# Patient Record
Sex: FEMALE | Race: OTHER | HISPANIC OR LATINO
[De-identification: names, ages, dates, MRNs, and addresses within clinical notes are randomized per-mention and may not be internally consistent; named-entity substitution may affect disease eponyms.]

---

## 2021-04-01 ENCOUNTER — APPOINTMENT (OUTPATIENT)
Dept: RHEUMATOLOGY | Facility: CLINIC | Age: 41
End: 2021-04-01
Payer: COMMERCIAL

## 2021-04-01 VITALS
DIASTOLIC BLOOD PRESSURE: 80 MMHG | TEMPERATURE: 96.6 F | BODY MASS INDEX: 27.05 KG/M2 | WEIGHT: 147 LBS | OXYGEN SATURATION: 98 % | HEART RATE: 73 BPM | HEIGHT: 62 IN | SYSTOLIC BLOOD PRESSURE: 102 MMHG

## 2021-04-01 DIAGNOSIS — Z78.9 OTHER SPECIFIED HEALTH STATUS: ICD-10-CM

## 2021-04-01 DIAGNOSIS — Z80.42 FAMILY HISTORY OF MALIGNANT NEOPLASM OF PROSTATE: ICD-10-CM

## 2021-04-01 DIAGNOSIS — Z82.62 FAMILY HISTORY OF OSTEOPOROSIS: ICD-10-CM

## 2021-04-01 DIAGNOSIS — M25.532 PAIN IN RIGHT WRIST: ICD-10-CM

## 2021-04-01 DIAGNOSIS — Z82.61 FAMILY HISTORY OF ARTHRITIS: ICD-10-CM

## 2021-04-01 DIAGNOSIS — M25.531 PAIN IN RIGHT WRIST: ICD-10-CM

## 2021-04-01 PROCEDURE — 99072 ADDL SUPL MATRL&STAF TM PHE: CPT

## 2021-04-01 PROCEDURE — 99205 OFFICE O/P NEW HI 60 MIN: CPT

## 2021-04-03 NOTE — ASSESSMENT
[FreeTextEntry1] : 40 year old female seen today for evaluation of left ankle pain and swelling.\par \par \par 1. Left ankle PVNS, dffuse type\par \par -MRI left ankle suggested PVNS with findings of 3.5 x 2.3 x 2.7 cm mass like synovial hypertrophy arising from anterolateral/dorsal talonavicular joint with possible involvement of the Gruberi bursa and smooth erosions of the dorsal talus. Anterior and calcaneofibular ligaments with findings suggestive of prior injury.\par -First line treatment is surgical with excision/synovectomy (arthroscopic vs. open). \par -Systemic therapy with pexidartinib and imatinib, nilotinib has been used for relapsing disease\par -Will check x-ray wrists, autoimmune serologies, inflammatory markers, x-ray wrists to rule out PVNS vs. inflammatory arthritis \par \par
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
## 2021-04-03 NOTE — PHYSICAL EXAM
[General Appearance - Alert] : alert [General Appearance - Well Nourished] : well nourished [Sclera] : the sclera and conjunctiva were normal [Extraocular Movements] : extraocular movements were intact [Outer Ear] : the ears and nose were normal in appearance [Hearing Threshold Finger Rub Not [place]] : hearing was normal [Neck Appearance] : the appearance of the neck was normal [Neck Cervical Mass (___cm)] : no neck mass was observed [Respiration, Rhythm And Depth] : normal respiratory rhythm and effort [Auscultation Breath Sounds / Voice Sounds] : lungs were clear to auscultation bilaterally [Heart Rate And Rhythm] : heart rate was normal and rhythm regular [Heart Sounds] : normal S1 and S2 [Bowel Sounds] : normal bowel sounds [Abdomen Tenderness] : non-tender [] : no rash [Skin Lesions] : no skin lesions [Sensation] : the sensory exam was normal to light touch and pinprick [Motor Exam] : the motor exam was normal [Mood] : the mood was normal [Affect] : the affect was normal [FreeTextEntry1] : No synovitis in wrists. Left ankle slight swelling compared to right. No other swelling or tenderness in joints.

## 2021-04-06 ENCOUNTER — NON-APPOINTMENT (OUTPATIENT)
Age: 41
End: 2021-04-06

## 2021-04-15 ENCOUNTER — APPOINTMENT (OUTPATIENT)
Dept: RHEUMATOLOGY | Facility: CLINIC | Age: 41
End: 2021-04-15
Payer: COMMERCIAL

## 2021-04-15 ENCOUNTER — TRANSCRIPTION ENCOUNTER (OUTPATIENT)
Age: 41
End: 2021-04-15

## 2021-04-15 VITALS
TEMPERATURE: 97.2 F | WEIGHT: 147 LBS | HEIGHT: 62 IN | HEART RATE: 88 BPM | OXYGEN SATURATION: 98 % | BODY MASS INDEX: 27.05 KG/M2

## 2021-04-15 DIAGNOSIS — M12.20 VILLONODULAR SYNOVITIS (PIGMENTED), UNSPECIFIED SITE: ICD-10-CM

## 2021-04-15 PROCEDURE — 99072 ADDL SUPL MATRL&STAF TM PHE: CPT

## 2021-04-15 PROCEDURE — 99214 OFFICE O/P EST MOD 30 MIN: CPT

## 2021-04-19 NOTE — ASSESSMENT
[FreeTextEntry1] : 40 year old female seen today for evaluation of left ankle pain and swelling.\par \par \par 1. Left ankle PVNS, dffuse type\par \par -MRI left ankle suggested PVNS with findings of 3.5 x 2.3 x 2.7 cm mass like synovial hypertrophy arising from anterolateral/dorsal talonavicular joint with possible involvement of the Gruberi bursa and smooth erosions of the dorsal talus. Anterior and calcaneofibular ligaments with findings suggestive of prior injury.\par -First line treatment is surgical with excision/synovectomy (arthroscopic vs. open). \par -Systemic therapy with pexidartinib and imatinib, nilotinib has been used for relapsing disease\par -Discussed treatment options in detail as above. She wishes to think about medical management as opposed to surgical management. I will refer her to oncology for this. If she wishes for surgical management I advised orthopedic follow up. All questions answered and they understand and agree to plan. \par \par 2. Arthralgias\par \par -Wrist x-ray and autoimmune serologies are negative. No concern for inflammatory arthritis.\par \par
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
## 2021-04-19 NOTE — PHYSICAL EXAM
[General Appearance - Alert] : alert [General Appearance - Well Nourished] : well nourished [Sclera] : the sclera and conjunctiva were normal [Extraocular Movements] : extraocular movements were intact [Outer Ear] : the ears and nose were normal in appearance [Hearing Threshold Finger Rub Not [place]] : hearing was normal [Neck Appearance] : the appearance of the neck was normal [Respiration, Rhythm And Depth] : normal respiratory rhythm and effort [Neck Cervical Mass (___cm)] : no neck mass was observed [Auscultation Breath Sounds / Voice Sounds] : lungs were clear to auscultation bilaterally [Heart Rate And Rhythm] : heart rate was normal and rhythm regular [Heart Sounds] : normal S1 and S2 [Bowel Sounds] : normal bowel sounds [Abdomen Tenderness] : non-tender [] : no rash [Skin Lesions] : no skin lesions [Sensation] : the sensory exam was normal to light touch and pinprick [Motor Exam] : the motor exam was normal [Affect] : the affect was normal [Mood] : the mood was normal [FreeTextEntry1] : No synovitis in wrists. Left ankle slight swelling compared to right. No other swelling or tenderness in joints.

## 2021-05-12 ENCOUNTER — APPOINTMENT (OUTPATIENT)
Dept: HEMATOLOGY ONCOLOGY | Facility: CLINIC | Age: 41
End: 2021-05-12
Payer: COMMERCIAL

## 2021-05-12 ENCOUNTER — LABORATORY RESULT (OUTPATIENT)
Age: 41
End: 2021-05-12

## 2021-05-12 VITALS
DIASTOLIC BLOOD PRESSURE: 77 MMHG | SYSTOLIC BLOOD PRESSURE: 126 MMHG | BODY MASS INDEX: 27.44 KG/M2 | RESPIRATION RATE: 14 BRPM | TEMPERATURE: 98.6 F | WEIGHT: 150 LBS | HEART RATE: 77 BPM

## 2021-05-12 PROCEDURE — 99205 OFFICE O/P NEW HI 60 MIN: CPT

## 2021-05-13 LAB
ALBUMIN SERPL ELPH-MCNC: 4.4 G/DL
ALP BLD-CCNC: 72 U/L
ALT SERPL-CCNC: 8 U/L
ANION GAP SERPL CALC-SCNC: 11 MMOL/L
AST SERPL-CCNC: 15 U/L
BILIRUB SERPL-MCNC: <0.2 MG/DL
BUN SERPL-MCNC: 20 MG/DL
CALCIUM SERPL-MCNC: 9.3 MG/DL
CHLORIDE SERPL-SCNC: 101 MMOL/L
CO2 SERPL-SCNC: 24 MMOL/L
CREAT SERPL-MCNC: 0.8 MG/DL
FERRITIN SERPL-MCNC: 7 NG/ML
FOLATE SERPL-MCNC: 15.5 NG/ML
GLUCOSE SERPL-MCNC: 82 MG/DL
HAPTOGLOB SERPL-MCNC: 79 MG/DL
HCT VFR BLD CALC: 36.1 %
HGB BLD-MCNC: 11.3 G/DL
IRON SATN MFR SERPL: 6 %
IRON SERPL-MCNC: 24 UG/DL
LDH SERPL-CCNC: 185
MCHC RBC-ENTMCNC: 22.7 PG
MCHC RBC-ENTMCNC: 31.3 G/DL
MCV RBC AUTO: 72.6 FL
PLATELET # BLD AUTO: 342 K/UL
PMV BLD: 9.6 FL
POTASSIUM SERPL-SCNC: 4.2 MMOL/L
PROT SERPL-MCNC: 7.1 G/DL
RBC # BLD: 4.97 M/UL
RBC # FLD: 15.6 %
RETICS # AUTO: 1.3 %
RETICS AGGREG/RBC NFR: 66.1 K/UL
SODIUM SERPL-SCNC: 136 MMOL/L
TIBC SERPL-MCNC: 400 UG/DL
UIBC SERPL-MCNC: 376 UG/DL
VIT B12 SERPL-MCNC: 444 PG/ML
WBC # FLD AUTO: 9.13 K/UL

## 2021-05-20 ENCOUNTER — APPOINTMENT (OUTPATIENT)
Dept: GASTROENTEROLOGY | Facility: CLINIC | Age: 41
End: 2021-05-20
Payer: COMMERCIAL

## 2021-05-20 DIAGNOSIS — Z80.42 FAMILY HISTORY OF MALIGNANT NEOPLASM OF PROSTATE: ICD-10-CM

## 2021-05-20 DIAGNOSIS — Z86.2 PERSONAL HISTORY OF DISEASES OF THE BLOOD AND BLOOD-FORMING ORGANS AND CERTAIN DISORDERS INVOLVING THE IMMUNE MECHANISM: ICD-10-CM

## 2021-05-20 DIAGNOSIS — Z80.1 FAMILY HISTORY OF MALIGNANT NEOPLASM OF TRACHEA, BRONCHUS AND LUNG: ICD-10-CM

## 2021-05-20 DIAGNOSIS — Z80.41 FAMILY HISTORY OF MALIGNANT NEOPLASM OF OVARY: ICD-10-CM

## 2021-05-20 DIAGNOSIS — K59.00 CONSTIPATION, UNSPECIFIED: ICD-10-CM

## 2021-05-20 PROCEDURE — 99204 OFFICE O/P NEW MOD 45 MIN: CPT | Mod: 95

## 2021-05-20 NOTE — ASSESSMENT
[FreeTextEntry1] : 40 year old female patient recently diagnosed with iron deficiency anemia referred for work up.\par Patient reports postprandial belching and bloating, nausea and indigestion and acid reflux even at night. \par Denies any blood in her stool, abdominal pain or weight loss, but has recently developed constipation. \par \par DOMINGO, no gross GI bleeding, heavy menstrual periods\par needs EGD, colonoscopy w biopsies\par Risks and benefits discussed with patient.\par Capsule endoscopy thereafter\par

## 2021-05-20 NOTE — HISTORY OF PRESENT ILLNESS
[Home] : at home, [unfilled] , at the time of the visit. [Verbal consent obtained from patient] : the patient, [unfilled] [Medical Office: ([facility])___] : at the medical office located in  [FreeTextEntry4] : Raya Agarwal [de-identified] : 40 year old female patient recently diagnosed with iron deficiency anemia referred for work up.\par Patient reports postprandial belching and bloating, nausea and indigestion and acid reflux even at night. \par Denies any blood in her stool, abdominal pain or weight loss, but has recently developed constipation.

## 2021-05-21 ENCOUNTER — APPOINTMENT (OUTPATIENT)
Dept: INFUSION THERAPY | Facility: CLINIC | Age: 41
End: 2021-05-21

## 2021-05-21 RX ORDER — IRON SUCROSE 20 MG/ML
200 INJECTION, SOLUTION INTRAVENOUS ONCE
Refills: 0 | Status: COMPLETED | OUTPATIENT
Start: 2021-05-21 | End: 2021-05-21

## 2021-05-21 RX ADMIN — IRON SUCROSE 200 MILLIGRAM(S): 20 INJECTION, SOLUTION INTRAVENOUS at 15:41

## 2021-05-21 RX ADMIN — IRON SUCROSE 146.67 MILLIGRAM(S): 20 INJECTION, SOLUTION INTRAVENOUS at 15:11

## 2021-05-28 ENCOUNTER — APPOINTMENT (OUTPATIENT)
Dept: INFUSION THERAPY | Facility: CLINIC | Age: 41
End: 2021-05-28

## 2021-05-28 RX ORDER — IRON SUCROSE 20 MG/ML
200 INJECTION, SOLUTION INTRAVENOUS ONCE
Refills: 0 | Status: COMPLETED | OUTPATIENT
Start: 2021-05-28 | End: 2021-05-28

## 2021-05-28 RX ADMIN — IRON SUCROSE 146.67 MILLIGRAM(S): 20 INJECTION, SOLUTION INTRAVENOUS at 14:29

## 2021-05-28 RX ADMIN — IRON SUCROSE 200 MILLIGRAM(S): 20 INJECTION, SOLUTION INTRAVENOUS at 14:59

## 2021-06-03 NOTE — HISTORY OF PRESENT ILLNESS
[de-identified] : Chayito is a 39 yo F with history of recently discovered PVNS of her left ankle presenting to clinic for history of anemia. Per patient, she has been in relatively good health until recently when she began to feel left sided ankle pain that is worse with exertion. She went to see an orthopedic specialist and was ultimately referred to rheumatology for further evaluation. She had a MRI done on her left ankle at [facility] on 3/28/21 which showed "3.5 x 2.3 x 2.7 cm mass like synovial hypertrophy arising from the anterolateral/dorsal talonavicular joint with possible involvement of the Gruberi bursa and smooth erosions of the dorsal talus. Overall appearance of findings suggestive of pigmented villonodular synovitis (PVNS). Soft tissue sampling can be considered to confirm diagnosis.\par \par She also had routine blood work sent which showed a hemoglobin of 11.5 with MCV 74.3, RBC count 4.98 with RDW 15.3. WBC and platelet count were normal. She was referred to hematology for further evaluation. \par \par She does endorse that she experiences heavy menses for the last 1 year. She denies any other sign of overt bleeding such as melena, hematochezia, gum or nose bleeding. She does endorse a history of anemia in her mother who required IV iron transfusions when younger. She has never had an EGD or colonoscopy though she is pending evaluation with GI in the next 1 month. \par \par Today, she endorses that she feels fatigue along with cold intolerance and craving for ice chips.

## 2021-06-03 NOTE — ASSESSMENT
[FreeTextEntry1] : #) Microcytic Anemia\par - Patient with Hemoglobin of 11.5 with MCV 74.3, RBC count 4.98 with RDW 15.3\par - Repeat CBC done in office shows hemoglobin of 11.3 with MCV 72.6\par - Iron studies confirm iron deficiency anemia with %sat of 6 and Ferritin 7. Hemolytic work up unremarkable \par - She will need IV Venofer x 5 doses, to be administered once a week \par - She is to follow up with GI, Dr. Elise for possible endoscopy and colonoscopy\par - She will follow up with GYN for heavy menstrual periods \par - Repeat  blood work to be done at next visit after completion of IV Venofer\par - Will consider sending Hemoglobin electrophoresis given her mother has history of anemia \par \par #) Suspected PVNS of left ankle\par - She was advised to follow up with her orthopedist to undergo evaluation for possible resection given this is recommending first line treatment, she is agreeable \par \par RTC in 8 weeks \par

## 2021-06-04 ENCOUNTER — APPOINTMENT (OUTPATIENT)
Dept: INFUSION THERAPY | Facility: CLINIC | Age: 41
End: 2021-06-04

## 2021-06-04 RX ORDER — IRON SUCROSE 20 MG/ML
200 INJECTION, SOLUTION INTRAVENOUS ONCE
Refills: 0 | Status: COMPLETED | OUTPATIENT
Start: 2021-06-04 | End: 2021-06-04

## 2021-06-04 RX ADMIN — IRON SUCROSE 146.67 MILLIGRAM(S): 20 INJECTION, SOLUTION INTRAVENOUS at 15:20

## 2021-06-07 ENCOUNTER — APPOINTMENT (OUTPATIENT)
Dept: ORTHOPEDIC SURGERY | Facility: CLINIC | Age: 41
End: 2021-06-07

## 2021-06-11 ENCOUNTER — APPOINTMENT (OUTPATIENT)
Dept: INFUSION THERAPY | Facility: CLINIC | Age: 41
End: 2021-06-11

## 2021-06-11 RX ORDER — IRON SUCROSE 20 MG/ML
200 INJECTION, SOLUTION INTRAVENOUS ONCE
Refills: 0 | Status: COMPLETED | OUTPATIENT
Start: 2021-06-11 | End: 2021-06-11

## 2021-06-11 RX ADMIN — IRON SUCROSE 146.67 MILLIGRAM(S): 20 INJECTION, SOLUTION INTRAVENOUS at 15:17

## 2021-06-11 RX ADMIN — IRON SUCROSE 200 MILLIGRAM(S): 20 INJECTION, SOLUTION INTRAVENOUS at 15:50

## 2021-06-18 ENCOUNTER — APPOINTMENT (OUTPATIENT)
Dept: INFUSION THERAPY | Facility: CLINIC | Age: 41
End: 2021-06-18

## 2021-06-18 VITALS
HEART RATE: 85 BPM | TEMPERATURE: 97.8 F | DIASTOLIC BLOOD PRESSURE: 76 MMHG | SYSTOLIC BLOOD PRESSURE: 116 MMHG | RESPIRATION RATE: 16 BRPM

## 2021-06-18 RX ORDER — IRON SUCROSE 20 MG/ML
200 INJECTION, SOLUTION INTRAVENOUS ONCE
Refills: 0 | Status: COMPLETED | OUTPATIENT
Start: 2021-06-18 | End: 2021-06-18

## 2021-06-18 RX ADMIN — IRON SUCROSE 200 MILLIGRAM(S): 20 INJECTION, SOLUTION INTRAVENOUS at 14:35

## 2021-06-18 RX ADMIN — IRON SUCROSE 146.67 MILLIGRAM(S): 20 INJECTION, SOLUTION INTRAVENOUS at 14:04

## 2021-06-25 ENCOUNTER — RESULT REVIEW (OUTPATIENT)
Age: 41
End: 2021-06-25

## 2021-06-25 ENCOUNTER — OUTPATIENT (OUTPATIENT)
Dept: OUTPATIENT SERVICES | Facility: HOSPITAL | Age: 41
LOS: 1 days | Discharge: HOME | End: 2021-06-25
Payer: COMMERCIAL

## 2021-06-25 ENCOUNTER — TRANSCRIPTION ENCOUNTER (OUTPATIENT)
Age: 41
End: 2021-06-25

## 2021-06-25 VITALS
HEIGHT: 62 IN | DIASTOLIC BLOOD PRESSURE: 76 MMHG | OXYGEN SATURATION: 100 % | TEMPERATURE: 98 F | RESPIRATION RATE: 18 BRPM | SYSTOLIC BLOOD PRESSURE: 121 MMHG | HEART RATE: 88 BPM | WEIGHT: 147.93 LBS

## 2021-06-25 VITALS — HEART RATE: 62 BPM | DIASTOLIC BLOOD PRESSURE: 72 MMHG | SYSTOLIC BLOOD PRESSURE: 122 MMHG | RESPIRATION RATE: 18 BRPM

## 2021-06-25 PROCEDURE — 88312 SPECIAL STAINS GROUP 1: CPT | Mod: 26

## 2021-06-25 PROCEDURE — 88305 TISSUE EXAM BY PATHOLOGIST: CPT | Mod: 26

## 2021-06-25 PROCEDURE — 43239 EGD BIOPSY SINGLE/MULTIPLE: CPT | Mod: XS

## 2021-06-25 PROCEDURE — 45380 COLONOSCOPY AND BIOPSY: CPT

## 2021-06-25 NOTE — ASU DISCHARGE PLAN (ADULT/PEDIATRIC) - CARE PROVIDER_API CALL
Germania Elise (MD)  Gastroenterology  [address] [place] [address]  Phone: [phone]  Fax: [phone]  Follow Up Time:

## 2021-06-28 LAB
SURGICAL PATHOLOGY STUDY: SIGNIFICANT CHANGE UP
SURGICAL PATHOLOGY STUDY: SIGNIFICANT CHANGE UP

## 2021-07-01 DIAGNOSIS — D12.6 BENIGN NEOPLASM OF COLON, UNSPECIFIED: ICD-10-CM

## 2021-07-01 DIAGNOSIS — K29.50 UNSPECIFIED CHRONIC GASTRITIS WITHOUT BLEEDING: ICD-10-CM

## 2021-07-01 DIAGNOSIS — K29.80 DUODENITIS WITHOUT BLEEDING: ICD-10-CM

## 2021-07-01 DIAGNOSIS — D50.9 IRON DEFICIENCY ANEMIA, UNSPECIFIED: ICD-10-CM

## 2021-07-01 DIAGNOSIS — K64.4 RESIDUAL HEMORRHOIDAL SKIN TAGS: ICD-10-CM

## 2021-07-01 DIAGNOSIS — K62.1 RECTAL POLYP: ICD-10-CM

## 2021-07-21 ENCOUNTER — APPOINTMENT (OUTPATIENT)
Dept: HEMATOLOGY ONCOLOGY | Facility: CLINIC | Age: 41
End: 2021-07-21
Payer: COMMERCIAL

## 2021-07-21 ENCOUNTER — LABORATORY RESULT (OUTPATIENT)
Age: 41
End: 2021-07-21

## 2021-07-21 ENCOUNTER — OUTPATIENT (OUTPATIENT)
Dept: OUTPATIENT SERVICES | Facility: HOSPITAL | Age: 41
LOS: 1 days | Discharge: HOME | End: 2021-07-21

## 2021-07-21 VITALS
HEART RATE: 73 BPM | DIASTOLIC BLOOD PRESSURE: 74 MMHG | RESPIRATION RATE: 16 BRPM | TEMPERATURE: 97.1 F | BODY MASS INDEX: 27.23 KG/M2 | HEIGHT: 62 IN | WEIGHT: 148 LBS | SYSTOLIC BLOOD PRESSURE: 109 MMHG

## 2021-07-21 DIAGNOSIS — Z00.00 ENCOUNTER FOR GENERAL ADULT MEDICAL EXAMINATION W/OUT ABNORMAL FINDINGS: ICD-10-CM

## 2021-07-21 DIAGNOSIS — D50.9 IRON DEFICIENCY ANEMIA, UNSPECIFIED: ICD-10-CM

## 2021-07-21 PROBLEM — D64.9 ANEMIA, UNSPECIFIED: Chronic | Status: ACTIVE | Noted: 2021-06-25

## 2021-07-21 LAB
HCT VFR BLD CALC: 42.1 %
HGB BLD-MCNC: 13.5 G/DL
MCHC RBC-ENTMCNC: 25.4 PG
MCHC RBC-ENTMCNC: 32.1 G/DL
MCV RBC AUTO: 79.3 FL
PLATELET # BLD AUTO: 305 K/UL
PMV BLD: 9.7 FL
RBC # BLD: 5.31 M/UL
RBC # FLD: 17.5 %
WBC # FLD AUTO: 6.68 K/UL

## 2021-07-21 PROCEDURE — 99213 OFFICE O/P EST LOW 20 MIN: CPT

## 2021-07-25 NOTE — HISTORY OF PRESENT ILLNESS
[de-identified] : Chayito is a 41 yo F with history of recently discovered PVNS of her left ankle presenting to clinic for history of anemia. Per patient, she has been in relatively good health until recently when she began to feel left sided ankle pain that is worse with exertion. She went to see an orthopedic specialist and was ultimately referred to rheumatology for further evaluation. She had a MRI done on her left ankle at [facility] on 3/28/21 which showed "3.5 x 2.3 x 2.7 cm mass like synovial hypertrophy arising from the anterolateral/dorsal talonavicular joint with possible involvement of the Gruberi bursa and smooth erosions of the dorsal talus. Overall appearance of findings suggestive of pigmented villonodular synovitis (PVNS). Soft tissue sampling can be considered to confirm diagnosis.\par \par She also had routine blood work sent which showed a hemoglobin of 11.5 with MCV 74.3, RBC count 4.98 with RDW 15.3. WBC and platelet count were normal. She was referred to hematology for further evaluation. \par \par She does endorse that she experiences heavy menses for the last 1 year. She denies any other sign of overt bleeding such as melena, hematochezia, gum or nose bleeding. She does endorse a history of anemia in her mother who required IV iron transfusions when younger. She has never had an EGD or colonoscopy though she is pending evaluation with GI in the next 1 month. \par \par Today, she endorses that she feels fatigue along with cold intolerance and craving for ice chips.   [de-identified] : 7/21/2021:  Chayito is a 40 present here today for a follow up for DOMINGO. Patient reports feeling well. she just had Colonoscopy done with 2 polyps removed, she was also diagnosed with Chronic Gastritis. \par She is scheduled for Small bowel series on 8/17/21 with Dr. GUTIERREZ\par We reviewed CBC from today with HGB/ HCt i s 13.5/ 42.1, iron studies are pending. \par Also patient will follow up in [facility] for Left ankle PVNS.\par

## 2021-07-25 NOTE — END OF VISIT
[FreeTextEntry3] : Patient was seen seen examined with NP Rose Mary, agree with above plan of care.\par

## 2021-07-25 NOTE — ASSESSMENT
[FreeTextEntry1] : #) Microcytic Anemia\par - Patient with Hemoglobin of 11.5 with MCV 74.3, RBC count 4.98 with RDW 15.3\par - Repeat CBC done in office shows hemoglobin of 11.3 with MCV 72.6\par - Iron studies confirm iron deficiency anemia with %sat of 6 and Ferritin 7. Hemolytic work up unremarkable \par - She will need IV Venofer x 5 doses, to be administered once a week \par - She is to follow up with GI, Dr. Elise for possible endoscopy and colonoscopy\par - She will follow up with GYN for heavy menstrual periods \par - Repeat  blood work to be done at next visit after completion of IV Venofer\par - ON 7/21/21 Monitor CBC and Ferritin Level. \par - Follow up with GI as scheduled\par \par #) Suspected PVNS of left ankle\par - She was advised to follow up with her orthopedist to undergo evaluation for possible resection given this is recommending first line treatment, she is agreeable \par - She will  follow up in [facility] for Left ankle  PVNS\par \par RTC in 8 weeks \par Patient seen and blood work reviewed  with Dr. Hatfield who agreed for the above plan of care. \par

## 2021-07-29 LAB
ENDOMYSIUM IGA SER QL: NEGATIVE
ENDOMYSIUM IGA TITR SER: NORMAL
FERRITIN SERPL-MCNC: 136 NG/ML
HGB A MFR BLD: 97.7 %
HGB A2 MFR BLD: 2.3 %
HGB FRACT BLD-IMP: NORMAL
IRON SATN MFR SERPL: 35 %
IRON SERPL-MCNC: 100 UG/DL
TIBC SERPL-MCNC: 285 UG/DL
TTG IGA SER IA-ACNC: <1.2 U/ML
TTG IGA SER-ACNC: NEGATIVE
TTG IGG SER IA-ACNC: 1.2 U/ML
TTG IGG SER IA-ACNC: NEGATIVE
UIBC SERPL-MCNC: 185 UG/DL

## 2021-08-17 ENCOUNTER — LABORATORY RESULT (OUTPATIENT)
Age: 41
End: 2021-08-17

## 2021-08-17 ENCOUNTER — OUTPATIENT (OUTPATIENT)
Dept: OUTPATIENT SERVICES | Facility: HOSPITAL | Age: 41
LOS: 1 days | Discharge: HOME | End: 2021-08-17

## 2021-08-17 DIAGNOSIS — Z11.59 ENCOUNTER FOR SCREENING FOR OTHER VIRAL DISEASES: ICD-10-CM

## 2021-08-19 ENCOUNTER — OUTPATIENT (OUTPATIENT)
Dept: OUTPATIENT SERVICES | Facility: HOSPITAL | Age: 41
LOS: 1 days | Discharge: HOME | End: 2021-08-19
Payer: COMMERCIAL

## 2021-08-19 VITALS
RESPIRATION RATE: 18 BRPM | TEMPERATURE: 97 F | HEART RATE: 67 BPM | WEIGHT: 147.93 LBS | DIASTOLIC BLOOD PRESSURE: 78 MMHG | HEIGHT: 62 IN | SYSTOLIC BLOOD PRESSURE: 115 MMHG

## 2021-08-19 DIAGNOSIS — B50.9 PLASMODIUM FALCIPARUM MALARIA, UNSPECIFIED: ICD-10-CM

## 2021-08-19 PROCEDURE — 91110 GI TRC IMG INTRAL ESOPH-ILE: CPT | Mod: 26

## 2021-08-19 NOTE — H&P PST ADULT - HISTORY OF PRESENT ILLNESS
40 year old female patient recently diagnosed with iron deficiency anemia referred for work up. Patient reports postprandial belching and bloating, nausea and indigestion and acid reflux even at night. Denies any blood in her stool, abdominal pain or weight loss, but has recently developed constipation. Pt underwent EGD and colonoscopy which did not identify any overt etiology as such pt will undergo VCE.

## 2021-08-19 NOTE — H&P PST ADULT - ASSESSMENT
40 year old female patient recently diagnosed with iron deficiency anemia referred for work up. Patient reports postprandial belching and bloating, nausea and indigestion and acid reflux even at night. Denies any blood in her stool, abdominal pain or weight loss, but has recently developed constipation. Pt underwent EGD and colonoscopy which did not identify any overt etiology as such pt will undergo VCE.    #Iron Deficiency anemia  - EGD: < from: EGD-Colonoscopy (06.25.21 @ 12:15) >   Normal mucosa in the whole esophagus.    Erythema in the stomach compatible with non-erosive gastritis. (Biopsy).    Normal mucosa in the whole examined duodenum. (Biopsy).     - Colonoscopy- < from: EGD-Colonoscopy (06.25.21 @ 12:15) >   Polyp (7 mm) in the mid rectum. (Polypectomy).    Polyp (5 mm) in the ascending colon. (Polypectomy).    Otherwise normal colon and terminal ileum.    External hemorrhoids.     < end of copied text >    - Will proceed with VCE

## 2021-08-19 NOTE — ASU PATIENT PROFILE, ADULT - HISTORY OF COVID-19 VACCINATION
Yes Principal Discharge DX:	Shortness of breath  Assessment and plan of treatment:	Call your primary care doctor today or tomorrow to schedule follow up appointment for within the next 3-5 days.  Limit your use of recreational drugs.  Return to this Emergency Department if you experience worsening condition or for any other emergency.

## 2021-08-19 NOTE — ASU DISCHARGE PLAN (ADULT/PEDIATRIC) - CALL YOUR DOCTOR IF YOU HAVE ANY OF THE FOLLOWING:
Bleeding that does not stop/Pain not relieved by Medications/Unable to urinate/Excessive diarrhea/Inability to tolerate liquids or foods

## 2021-08-19 NOTE — ASU DISCHARGE PLAN (ADULT/PEDIATRIC) - DISCHARGE PLAN IS COMPLETE AND GIVEN TO PATIENT
: Yes The patient was informed that with the Basic option, they will most likely need prescription glasses at all focal points after surgery. The patient elects Basic OD, goal of -2.00.

## 2021-08-19 NOTE — ASU PATIENT PROFILE, ADULT - MEDICATIONS BROUGHT TO HOSPITAL, PROFILE
ANN-MARIE 09/2017 with Dr. Ty   Pt scheduled with dr. Cool next month   Please advise on Quinaprill    /    PHCY set  
no

## 2021-09-24 ENCOUNTER — APPOINTMENT (OUTPATIENT)
Dept: HEMATOLOGY ONCOLOGY | Facility: CLINIC | Age: 41
End: 2021-09-24

## 2021-10-15 ENCOUNTER — EMERGENCY (EMERGENCY)
Facility: HOSPITAL | Age: 41
LOS: 0 days | Discharge: HOME | End: 2021-10-15
Attending: STUDENT IN AN ORGANIZED HEALTH CARE EDUCATION/TRAINING PROGRAM | Admitting: STUDENT IN AN ORGANIZED HEALTH CARE EDUCATION/TRAINING PROGRAM
Payer: COMMERCIAL

## 2021-10-15 VITALS
SYSTOLIC BLOOD PRESSURE: 125 MMHG | HEART RATE: 79 BPM | TEMPERATURE: 98 F | OXYGEN SATURATION: 99 % | DIASTOLIC BLOOD PRESSURE: 79 MMHG | RESPIRATION RATE: 18 BRPM

## 2021-10-15 VITALS
TEMPERATURE: 98 F | HEART RATE: 88 BPM | RESPIRATION RATE: 18 BRPM | DIASTOLIC BLOOD PRESSURE: 87 MMHG | HEIGHT: 62 IN | OXYGEN SATURATION: 98 % | SYSTOLIC BLOOD PRESSURE: 128 MMHG

## 2021-10-15 DIAGNOSIS — R42 DIZZINESS AND GIDDINESS: ICD-10-CM

## 2021-10-15 DIAGNOSIS — Z86.2 PERSONAL HISTORY OF DISEASES OF THE BLOOD AND BLOOD-FORMING ORGANS AND CERTAIN DISORDERS INVOLVING THE IMMUNE MECHANISM: ICD-10-CM

## 2021-10-15 DIAGNOSIS — R35.0 FREQUENCY OF MICTURITION: ICD-10-CM

## 2021-10-15 LAB
ALBUMIN SERPL ELPH-MCNC: 5 G/DL — SIGNIFICANT CHANGE UP (ref 3.5–5.2)
ALP SERPL-CCNC: 91 U/L — SIGNIFICANT CHANGE UP (ref 30–115)
ALT FLD-CCNC: 19 U/L — SIGNIFICANT CHANGE UP (ref 0–41)
ANION GAP SERPL CALC-SCNC: 15 MMOL/L — HIGH (ref 7–14)
APPEARANCE UR: CLEAR — SIGNIFICANT CHANGE UP
AST SERPL-CCNC: 20 U/L — SIGNIFICANT CHANGE UP (ref 0–41)
BASOPHILS # BLD AUTO: 0.04 K/UL — SIGNIFICANT CHANGE UP (ref 0–0.2)
BASOPHILS NFR BLD AUTO: 0.5 % — SIGNIFICANT CHANGE UP (ref 0–1)
BILIRUB SERPL-MCNC: 0.6 MG/DL — SIGNIFICANT CHANGE UP (ref 0.2–1.2)
BILIRUB UR-MCNC: NEGATIVE — SIGNIFICANT CHANGE UP
BUN SERPL-MCNC: 9 MG/DL — LOW (ref 10–20)
CALCIUM SERPL-MCNC: 10 MG/DL — SIGNIFICANT CHANGE UP (ref 8.5–10.1)
CHLORIDE SERPL-SCNC: 102 MMOL/L — SIGNIFICANT CHANGE UP (ref 98–110)
CO2 SERPL-SCNC: 24 MMOL/L — SIGNIFICANT CHANGE UP (ref 17–32)
COLOR SPEC: SIGNIFICANT CHANGE UP
CREAT SERPL-MCNC: 0.8 MG/DL — SIGNIFICANT CHANGE UP (ref 0.7–1.5)
DIFF PNL FLD: NEGATIVE — SIGNIFICANT CHANGE UP
EOSINOPHIL # BLD AUTO: 0.12 K/UL — SIGNIFICANT CHANGE UP (ref 0–0.7)
EOSINOPHIL NFR BLD AUTO: 1.5 % — SIGNIFICANT CHANGE UP (ref 0–8)
GLUCOSE SERPL-MCNC: 79 MG/DL — SIGNIFICANT CHANGE UP (ref 70–99)
GLUCOSE UR QL: NEGATIVE — SIGNIFICANT CHANGE UP
HCT VFR BLD CALC: 44.3 % — SIGNIFICANT CHANGE UP (ref 37–47)
HGB BLD-MCNC: 14.5 G/DL — SIGNIFICANT CHANGE UP (ref 12–16)
IMM GRANULOCYTES NFR BLD AUTO: 0.6 % — HIGH (ref 0.1–0.3)
KETONES UR-MCNC: NEGATIVE — SIGNIFICANT CHANGE UP
LEUKOCYTE ESTERASE UR-ACNC: NEGATIVE — SIGNIFICANT CHANGE UP
LYMPHOCYTES # BLD AUTO: 1.78 K/UL — SIGNIFICANT CHANGE UP (ref 1.2–3.4)
LYMPHOCYTES # BLD AUTO: 22.6 % — SIGNIFICANT CHANGE UP (ref 20.5–51.1)
MCHC RBC-ENTMCNC: 26.4 PG — LOW (ref 27–31)
MCHC RBC-ENTMCNC: 32.7 G/DL — SIGNIFICANT CHANGE UP (ref 32–37)
MCV RBC AUTO: 80.7 FL — LOW (ref 81–99)
MONOCYTES # BLD AUTO: 0.57 K/UL — SIGNIFICANT CHANGE UP (ref 0.1–0.6)
MONOCYTES NFR BLD AUTO: 7.3 % — SIGNIFICANT CHANGE UP (ref 1.7–9.3)
NEUTROPHILS # BLD AUTO: 5.3 K/UL — SIGNIFICANT CHANGE UP (ref 1.4–6.5)
NEUTROPHILS NFR BLD AUTO: 67.5 % — SIGNIFICANT CHANGE UP (ref 42.2–75.2)
NITRITE UR-MCNC: NEGATIVE — SIGNIFICANT CHANGE UP
NRBC # BLD: 0 /100 WBCS — SIGNIFICANT CHANGE UP (ref 0–0)
PH UR: 6 — SIGNIFICANT CHANGE UP (ref 5–8)
PLATELET # BLD AUTO: 374 K/UL — SIGNIFICANT CHANGE UP (ref 130–400)
POTASSIUM SERPL-MCNC: 4.3 MMOL/L — SIGNIFICANT CHANGE UP (ref 3.5–5)
POTASSIUM SERPL-SCNC: 4.3 MMOL/L — SIGNIFICANT CHANGE UP (ref 3.5–5)
PROT SERPL-MCNC: 8 G/DL — SIGNIFICANT CHANGE UP (ref 6–8)
PROT UR-MCNC: NEGATIVE — SIGNIFICANT CHANGE UP
RBC # BLD: 5.49 M/UL — HIGH (ref 4.2–5.4)
RBC # FLD: 13.2 % — SIGNIFICANT CHANGE UP (ref 11.5–14.5)
SODIUM SERPL-SCNC: 141 MMOL/L — SIGNIFICANT CHANGE UP (ref 135–146)
SP GR SPEC: 1.01 — SIGNIFICANT CHANGE UP (ref 1.01–1.03)
TROPONIN T SERPL-MCNC: <0.01 NG/ML — SIGNIFICANT CHANGE UP
UROBILINOGEN FLD QL: SIGNIFICANT CHANGE UP
WBC # BLD: 7.86 K/UL — SIGNIFICANT CHANGE UP (ref 4.8–10.8)
WBC # FLD AUTO: 7.86 K/UL — SIGNIFICANT CHANGE UP (ref 4.8–10.8)

## 2021-10-15 PROCEDURE — 99284 EMERGENCY DEPT VISIT MOD MDM: CPT

## 2021-10-15 PROCEDURE — 71046 X-RAY EXAM CHEST 2 VIEWS: CPT | Mod: 26

## 2021-10-15 RX ORDER — MECLIZINE HCL 12.5 MG
1 TABLET ORAL
Qty: 21 | Refills: 0
Start: 2021-10-15 | End: 2021-10-21

## 2021-10-15 RX ORDER — SODIUM CHLORIDE 9 MG/ML
1000 INJECTION, SOLUTION INTRAVENOUS ONCE
Refills: 0 | Status: COMPLETED | OUTPATIENT
Start: 2021-10-15 | End: 2021-10-15

## 2021-10-15 RX ADMIN — SODIUM CHLORIDE 1000 MILLILITER(S): 9 INJECTION, SOLUTION INTRAVENOUS at 13:00

## 2021-10-15 NOTE — ED PROVIDER NOTE - IV ALTEPLASE ADMIN OUTSIDE HIDDEN
Patient Education        Viral Illness in Children: Care Instructions  Your Care Instructions    Viruses cause many illnesses in children, from colds and stomach flu to mumps. Sometimes children have general symptoms--such as not feeling like eating or just not feeling well--that do not fit with a specific illness. If your child has a rash, your doctor may be able to tell clearly if your child has an illness such as measles. Sometimes a child may have what is called a nonspecific viral illness that is not as easy to name. A number of viruses can cause this mild illness. Antibiotics do not work for a viral illness. Your child will probably feel better in a few days. If not, call your child's doctor. Follow-up care is a key part of your child's treatment and safety. Be sure to make and go to all appointments, and call your doctor if your child is having problems. It's also a good idea to know your child's test results and keep a list of the medicines your child takes. How can you care for your child at home? · Have your child rest.  · Give your child acetaminophen (Tylenol) or ibuprofen (Advil, Motrin) for fever, pain, or fussiness. Read and follow all instructions on the label. Do not give aspirin to anyone younger than 20. It has been linked to Reye syndrome, a serious illness. · Be careful when giving your child over-the-counter cold or flu medicines and Tylenol at the same time. Many of these medicines contain acetaminophen, which is Tylenol. Read the labels to make sure that you are not giving your child more than the recommended dose. Too much Tylenol can be harmful. · Be careful with cough and cold medicines. Don't give them to children younger than 6, because they don't work for children that age and can even be harmful. For children 6 and older, always follow all the instructions carefully. Make sure you know how much medicine to give and how long to use it.  And use the dosing device if one is included. · Give your child lots of fluids, enough so that the urine is light yellow or clear like water. This is very important if your child is vomiting or has diarrhea. Give your child sips of water or drinks such as Pedialyte or Infalyte. These drinks contain a mix of salt, sugar, and minerals. You can buy them at drugstores or grocery stores. Give these drinks as long as your child is throwing up or has diarrhea. Do not use them as the only source of liquids or food for more than 12 to 24 hours. · Keep your child home from school, day care, or other public places while he or she has a fever. · Use cold, wet cloths on a rash to reduce itching. When should you call for help? Call your doctor now or seek immediate medical care if:    · Your child has signs of needing more fluids. These signs include sunken eyes with few tears, dry mouth with little or no spit, and little or no urine for 6 hours.    Watch closely for changes in your child's health, and be sure to contact your doctor if:    · Your child has a new or higher fever.     · Your child is not feeling better within 2 days.     · Your child's symptoms are getting worse. Where can you learn more? Go to http://zakia-josh.info/. Enter 157 [id number] in the search box to learn more about \"Viral Illness in Children: Care Instructions. \"  Current as of: July 30, 2018  Content Version: 11.9  © 8197-8600 [employer]. Care instructions adapted under license by [employer] (which disclaims liability or warranty for this information). If you have questions about a medical condition or this instruction, always ask your healthcare professional. [place] [address] any warranty or liability for your use of this information. show

## 2021-10-15 NOTE — ED PROVIDER NOTE - CLINICAL SUMMARY MEDICAL DECISION MAKING FREE TEXT BOX
.    41 y/o F pmh as noted, p/w dizziness described as room spinning (not presyncope) upon waking 3d ago. sx have been improving, Worse w/ movement, better at rest. + urinary frequency and some loose stool. No cp, HA, neuro deficit. No ENT sx. Agree w/ exam; RRR, no neuro deficit. Pt well appearing.    Labs and UA reviewed. Pt felt better after IVF. sx resolved.     Discussed all available results with Pt.  Pt understands results, plan of care, outpt follow up as discussed, and signs and symptoms for ED return.  Pt is comfortable with discharge. DC home.     .      IMP: dizziness, resolved.

## 2021-10-15 NOTE — ED PROVIDER NOTE - PHYSICAL EXAMINATION
Physical Exam    Vital Signs: I have reviewed the initial vital signs.  Constitutional: well-nourished, appears stated age, no acute distress  Eyes: Conjunctiva pink, Sclera clear, PERRLA, EOMI without pain. no nystagmus.   Cardiovascular: S1 and S2, regular rate, regular rhythm, well-perfused extremities, radial pulses equal and 2+ b/l.   Respiratory: unlabored respiratory effort, clear to auscultation bilaterally no wheezing, rales and rhonchi. pt is speaking full sentences. no accessory muscle use.   Gastrointestinal: soft, non-tender, nondistended abdomen, no pulsatile mass, normal bowl sounds, no rebound, no guarding, no organomegaly.   Musculoskeletal: supple neck, no lower extremity edema, no calf tenderness, FROM of b/l upper and lower extremities.   Integumentary: warm, dry, no rash  Neurologic: awake, alert, cranial nerves II-XII grossly intact, extremities’ motor and sensory functions grossly intact. finger to nose intact. negative pronator drift. negative romberg. steady gait. 5/5 strength throughout.   Psychiatric: appropriate mood, appropriate affect

## 2021-10-15 NOTE — ED PROVIDER NOTE - NSFOLLOWUPCLINICS_GEN_ALL_ED_FT
[facility]  ENT  [address], [address]nd [place] [address]  Phone: [phone]  Fax:   Follow Up Time: 1-3 Days    [facility]  Neurology  [address], [address]  [place] [address]  Phone: [phone]  Fax:   Follow Up Time: 1-3 Days

## 2021-10-15 NOTE — ED PROVIDER NOTE - NS ED ROS FT
CONST: No fever, chills or bodyaches  EYES: No pain, redness, drainage or visual changes.  ENT: No ear pain or discharge, nasal discharge or congestion. No sore throat  CARD: No chest pain, palpitations  RESP: No SOB, cough, hemoptysis. No hx of asthma or COPD  GI: No abdominal pain, N/V/D  : No urinary symptoms. (+) urinary frequency  MS: No joint pain, back pain or extremity pain/injury  SKIN: No rashes  NEURO: No headache (+) dizziness. No paresthesias or LOC

## 2021-10-15 NOTE — ED ADULT NURSE NOTE - OBJECTIVE STATEMENT
Pt presents to ED c/o "feeling like room was spinning" since Wednesday AM. Pt reports feeling anxious and having frequent urination/defecation. Pt denies fever, denies dizziness at this time.

## 2021-10-15 NOTE — ED ADULT TRIAGE NOTE - CHIEF COMPLAINT QUOTE
pt c/o feeling like room was spinning when waking up Wednesday am, reports feeling anxious and having frequent urination and defecation

## 2021-10-15 NOTE — ED PROVIDER NOTE - PATIENT PORTAL LINK FT
You can access the FollowMyHealth Patient Portal offered by [facility] by registering at the following website: [email or web address]. By joining [employer]’s FollowMyHealth portal, you will also be able to view your health information using other applications (apps) compatible with our system.

## 2021-10-17 LAB
CULTURE RESULTS: NO GROWTH — SIGNIFICANT CHANGE UP
SPECIMEN SOURCE: SIGNIFICANT CHANGE UP

## 2021-11-02 ENCOUNTER — APPOINTMENT (OUTPATIENT)
Dept: OTOLARYNGOLOGY | Facility: CLINIC | Age: 41
End: 2021-11-02
Payer: COMMERCIAL

## 2021-11-02 VITALS — BODY MASS INDEX: 27.6 KG/M2 | HEIGHT: 62 IN | WEIGHT: 150 LBS

## 2021-11-02 PROCEDURE — 92557 COMPREHENSIVE HEARING TEST: CPT

## 2021-11-02 PROCEDURE — 99204 OFFICE O/P NEW MOD 45 MIN: CPT | Mod: 25

## 2021-11-02 PROCEDURE — 92550 TYMPANOMETRY & REFLEX THRESH: CPT

## 2021-11-02 NOTE — HISTORY OF PRESENT ILLNESS
[de-identified] : Patient presents today c/o dizziness.  She has been experiencing dizziness . Was seen in ED a few weeks ago , was told it vertigo .  Would feel like the room is spinning.  Feels it through out the day , when waking up and getting out of bed.  Has been experiencing hearing loss , hearing sound muffled.  Denies any history of ear infection.   Was prescribed   meclizine , hasn't used it yet.   No recent audiogram performed  [Hearing Loss] : hearing loss [Ear Fullness] : no ear fullness [Tinnitus] : tinnitus [Vertigo] : vertigo

## 2021-11-03 ENCOUNTER — RESULT REVIEW (OUTPATIENT)
Age: 41
End: 2021-11-03

## 2021-11-17 ENCOUNTER — OUTPATIENT (OUTPATIENT)
Dept: OUTPATIENT SERVICES | Facility: HOSPITAL | Age: 41
LOS: 1 days | Discharge: HOME | End: 2021-11-17

## 2021-11-17 ENCOUNTER — APPOINTMENT (OUTPATIENT)
Dept: SPEECH THERAPY | Facility: CLINIC | Age: 41
End: 2021-11-17

## 2021-11-17 DIAGNOSIS — R42 DIZZINESS AND GIDDINESS: ICD-10-CM

## 2021-11-23 ENCOUNTER — APPOINTMENT (OUTPATIENT)
Dept: NEUROLOGY | Facility: CLINIC | Age: 41
End: 2021-11-23
Payer: COMMERCIAL

## 2021-11-23 VITALS
SYSTOLIC BLOOD PRESSURE: 117 MMHG | DIASTOLIC BLOOD PRESSURE: 84 MMHG | HEIGHT: 62 IN | HEART RATE: 88 BPM | BODY MASS INDEX: 27.6 KG/M2 | OXYGEN SATURATION: 99 % | TEMPERATURE: 98.2 F | WEIGHT: 150 LBS

## 2021-11-23 PROCEDURE — 99215 OFFICE O/P EST HI 40 MIN: CPT

## 2021-11-23 NOTE — ASSESSMENT
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 She also has b/l hearing loss, tinnitus, both worse on the Left. She has gotten vestibular therapy. She says she no longer has vertigo, but still has some disequilibrium. She is pending MRI H and AIC w/wo contrast ordered by ENT. She denies hx of DM. Exam today is only significant for deviation to the R on FTN w/ eyes closed of LUE. Orthostats were negative. Recent cardiac workup was negative. Her dizziness is likely vestibular. \par \par PLAN:\par -C/w Vestibular Therapy\par -Obtain MRI H and with IAC w/wo contrast\par -Meclizine PRN \par -Follow up in 1-2 months \par

## 2021-11-23 NOTE — HISTORY OF PRESENT ILLNESS
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   \par \par She presented w/c/o of dizziness that feels like she is going to pass out, but the room is spinning. Sx are worse with movement, better with rest. Her cardiologist is Dr. Ovalle and a month ago she had Holter monitor (2 weeks( and stress test for CP work up. She has positive urinary frequency, but UA negative. She felt better after IVF. She was discharged on Meclizine. RBC was elevated, but MCV was low.  \par \par Today, patient presents with  and states that she no longer has vertigo, but she still feels dizzy/disequilibrium. Cardiac work up with Dr. Ovalle was negative. She saw ENT and reported b/l hearing loss, tinnitus, L > R. He gave her prednisone, but she did not take it. He ordered MRI H and AIC w/wo contrast that is pending. She feels the dizziness more when she is lying down. She gets neck stiffness at times. She keeps hydrate and denies hx of DM. She has been going to vestibular therapy. \par \par 
Inguinal hernia of right side without obstruction or gangrene  06/01/2018    Active  Asim Swanson

## 2021-11-23 NOTE — PHYSICAL EXAM
[FreeTextEntry1] : Orthostats were negative. \par \par Focal neurological exam:\par \par MS: Awake, alert, oriented to person, place, situation and time. Normal affect. Follows commands. \par \par Language: Speech is clear, fluent with good repetition & comprehension. No dysarthria. \par \par CNs 2 - 12 intact. EOMI no nystagmus, no diplopia. VFF. No facial asymmetry b/l, full eye closure strength b/l. Hearing grossly normal. Head turning & shoulder shrug intact b/l. Tongue midline, normal movements, no atrophy.\par \par Motor: Normal muscle bulk & tone. No noticeable tremor or seizure. No pronator drift. Muscle strength of b/l UE and b/l LE 5/5. \par \par Reflexes: DTR of biceps 1+, knees absent. \par \par Sensation: Intact to LT, temperature and vibration b/l throughout\par \par Cortical: No extinction\par \par Coordination: No dysmetria to FTN. FTN with eyes closed of LUE shows deviation to the Right; normal with the RUE. Negative Romberg. Heel walking, toe walking, heel to toe walking without issues. \par \par Gait: No postural instability. Normal stance and tandem gait.\par \par \par \par \par

## 2021-11-28 ENCOUNTER — OUTPATIENT (OUTPATIENT)
Dept: OUTPATIENT SERVICES | Facility: HOSPITAL | Age: 41
LOS: 1 days | Discharge: HOME | End: 2021-11-28
Payer: COMMERCIAL

## 2021-11-28 DIAGNOSIS — R42 DIZZINESS AND GIDDINESS: ICD-10-CM

## 2021-11-28 DIAGNOSIS — R51.9 HEADACHE, UNSPECIFIED: ICD-10-CM

## 2021-11-28 PROCEDURE — 70553 MRI BRAIN STEM W/O & W/DYE: CPT | Mod: 26,76

## 2021-11-30 ENCOUNTER — OUTPATIENT (OUTPATIENT)
Dept: OUTPATIENT SERVICES | Facility: HOSPITAL | Age: 41
LOS: 1 days | Discharge: HOME | End: 2021-11-30

## 2021-11-30 DIAGNOSIS — R42 DIZZINESS AND GIDDINESS: ICD-10-CM

## 2022-01-10 ENCOUNTER — APPOINTMENT (OUTPATIENT)
Dept: OTOLARYNGOLOGY | Facility: CLINIC | Age: 42
End: 2022-01-10
Payer: COMMERCIAL

## 2022-01-10 PROCEDURE — 99214 OFFICE O/P EST MOD 30 MIN: CPT

## 2022-01-10 NOTE — HISTORY OF PRESENT ILLNESS
[FreeTextEntry1] : Patient presents today following up on hearing loss, vertigo. Patient went for MRI and VNG. Patient denies any room spinning. No new complaints. Results were reviewed in depth with the patient.

## 2022-01-10 NOTE — DATA REVIEWED
[de-identified] : vng: bilateral weakness suspected. possible peripheral cns vestibular lesions. rotational chair testing to rule out or confirm bilateral weakness.  [de-identified] : \par MRI- 11/21- unremarkable

## 2022-01-26 ENCOUNTER — APPOINTMENT (OUTPATIENT)
Dept: NEUROLOGY | Facility: CLINIC | Age: 42
End: 2022-01-26
Payer: COMMERCIAL

## 2022-01-26 VITALS
TEMPERATURE: 97.9 F | SYSTOLIC BLOOD PRESSURE: 113 MMHG | OXYGEN SATURATION: 98 % | WEIGHT: 148 LBS | BODY MASS INDEX: 27.23 KG/M2 | HEART RATE: 76 BPM | DIASTOLIC BLOOD PRESSURE: 78 MMHG | HEIGHT: 62 IN

## 2022-01-26 DIAGNOSIS — R42 DIZZINESS AND GIDDINESS: ICD-10-CM

## 2022-01-26 PROCEDURE — 99214 OFFICE O/P EST MOD 30 MIN: CPT

## 2022-01-26 NOTE — REASON FOR VISIT
[Follow-Up: _____] : a [unfilled] follow-up visit [Spouse] : spouse [FreeTextEntry1] : for dizziness, likely vertigo, for which she was in the ED on 10/15/21

## 2022-01-26 NOTE — HISTORY OF PRESENT ILLNESS
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 She also has associated b/l hearing loss, tinnitus, both worse on the Left. Vestibular therapy had helped with her sx, but she had remaining disequilibrium. She denied hx of DM. Last visit, orthostats were negative. Per patient cardiac work up was negative and exam showed deviation to the R on FTN of LUE with eye closed  \par \par Since, MRI H and with IAC w/wo contrast were unremarkable aside from nonspecific scatter foci of white matter signal abnormality. Per patient ENT suggested she get hearing aides. Today, she states that the disequilibrium and spinning sensation is gone. She says that when she closes her eyes, sometimes she feels unsteady. Denies any neck strain or discomfort. \par \par

## 2022-01-26 NOTE — ASSESSMENT
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 She had vestibular therapy which she said was unhelpful and was found by ENT to have b/l hearing loss. She also had tinnitus and ear fullness. Exam was negative for orthostasis or proprioceptive issues associated with neuropathy. Last visit she had deviation to the R on FTN of LUE w/ eyes closed. Today, she states the disequilibrium/vertigo has resolved. When she closes her eyes, sometimes she feels unsteady. Denies any neck discomfort. MRI  H and with IAC w/wo contrast were unremarkable aside from nonspecific scatter foci of white matter signal abnormality. Cardiac work up with 2wk Holter and stress test were negative per pt. She may have ?Meniere's disease. Exam today is nonfocal. \par \par PLAN: \par -C/w Meclizine PRN \par -Vestibular Therapy as needed\par -Avoid salt, caffeine, tobacco, alcohol \par -Follow up with attending in 4-6 months

## 2022-01-26 NOTE — PHYSICAL EXAM
[FreeTextEntry1] : Focal neurological exam:\par \par MS: Awake, alert, oriented to person, place, situation and time. Normal affect. Follows commands. \par \par Language: Speech is clear, fluent with good repetition & comprehension. No dysarthria. \par \par CNs 2 - 12 intact. EOMI no nystagmus, no diplopia. VFF. No facial asymmetry b/l, full eye closure strength b/l. Hearing grossly normal. Head turning & shoulder shrug intact b/l. Tongue midline, normal movements, no atrophy.\par \par Motor: Normal muscle bulk & tone. No noticeable tremor or seizure. No pronator drift. Muscle strength of b/l UE and b/l LE 5/5. \par \par Reflexes: DTR of biceps 1+, knees absent.  \par \par Sensation: Intact to LT, temperature and vibration b/l throughout. Sensation to vibration of b/l ankles is about 11 s b/l. No issues with proprioception of b/l big toe. \par \par Cortical: No extinction\par \par Coordination: No dysmetria to FTN.\par \par Gait: No postural instability. Normal stance and tandem gait.\par \par \par \par \par

## 2022-04-25 ENCOUNTER — RESULT REVIEW (OUTPATIENT)
Age: 42
End: 2022-04-25

## 2022-04-25 ENCOUNTER — OUTPATIENT (OUTPATIENT)
Dept: OUTPATIENT SERVICES | Facility: HOSPITAL | Age: 42
LOS: 1 days | End: 2022-04-25
Payer: COMMERCIAL

## 2022-04-25 LAB — GLUCOSE BLDC GLUCOMTR-MCNC: 88 MG/DL — SIGNIFICANT CHANGE UP (ref 70–99)

## 2022-04-25 PROCEDURE — 78815 PET IMAGE W/CT SKULL-THIGH: CPT

## 2022-04-25 PROCEDURE — A9552: CPT

## 2022-04-25 PROCEDURE — 78815 PET IMAGE W/CT SKULL-THIGH: CPT | Mod: 26,PI

## 2022-04-25 PROCEDURE — 82962 GLUCOSE BLOOD TEST: CPT

## 2022-05-11 ENCOUNTER — APPOINTMENT (OUTPATIENT)
Dept: OTOLARYNGOLOGY | Facility: CLINIC | Age: 42
End: 2022-05-11

## 2022-05-25 ENCOUNTER — NON-APPOINTMENT (OUTPATIENT)
Age: 42
End: 2022-05-25

## 2022-05-25 ENCOUNTER — APPOINTMENT (OUTPATIENT)
Dept: OBGYN | Facility: CLINIC | Age: 42
End: 2022-05-25
Payer: COMMERCIAL

## 2022-05-25 VITALS
WEIGHT: 148 LBS | HEIGHT: 62 IN | SYSTOLIC BLOOD PRESSURE: 120 MMHG | BODY MASS INDEX: 27.23 KG/M2 | DIASTOLIC BLOOD PRESSURE: 83 MMHG

## 2022-05-25 DIAGNOSIS — C82.90 FOLLICULAR LYMPHOMA, UNSPECIFIED, UNSPECIFIED SITE: ICD-10-CM

## 2022-05-25 PROCEDURE — 99386 PREV VISIT NEW AGE 40-64: CPT

## 2022-05-25 NOTE — PHYSICAL EXAM
[Appropriately responsive] : appropriately responsive [Soft] : soft [Non-tender] : non-tender [Non-distended] : non-distended [No Lesions] : no lesions [No Mass] : no mass [Examination Of The Breasts] : a normal appearance [No Masses] : no breast masses were palpable [Axillary Lymph Nodes Enlarged On The Left] : enlarged node on the left [Labia Majora] : normal [Labia Minora] : normal [No Bleeding] : There was no active vaginal bleeding [Normal] : normal [Retroversion] : retroverted [Uterine Adnexae] : normal

## 2022-05-25 NOTE — HISTORY OF PRESENT ILLNESS
[FreeTextEntry1] : 42 yo P3 presents for annual exam\par Reports HMB x several year and DOMINGO. Reports passing quarter size blood clots. \par Recently disganosed w/ follicular lymphoma, not currently on any treatment. \par Last seen by GYN 1year ago, reports normal pap smear at that time\par denies h/o cysts, fibroids STD's\par Last mammogram 3/2022, normal per patient other than known lymphadenopathy\par obhx:  X 3

## 2022-05-25 NOTE — DISCUSSION/SUMMARY
[FreeTextEntry1] : 40 yo for annual exam, HMB, vaginal discharge\par - f/u labs\par - f/u pap, hpv, aptima\par -referral given for TVUS

## 2022-06-01 LAB — HPV HIGH+LOW RISK DNA PNL CVX: NOT DETECTED

## 2022-06-02 ENCOUNTER — APPOINTMENT (OUTPATIENT)
Dept: ANTEPARTUM | Facility: CLINIC | Age: 42
End: 2022-06-02
Payer: COMMERCIAL

## 2022-06-02 ENCOUNTER — ASOB RESULT (OUTPATIENT)
Age: 42
End: 2022-06-02

## 2022-06-02 VITALS
HEART RATE: 72 BPM | WEIGHT: 147 LBS | SYSTOLIC BLOOD PRESSURE: 120 MMHG | BODY MASS INDEX: 27.05 KG/M2 | HEIGHT: 62 IN | DIASTOLIC BLOOD PRESSURE: 79 MMHG

## 2022-06-02 PROCEDURE — 76830 TRANSVAGINAL US NON-OB: CPT

## 2022-06-08 LAB
ANTI-MUELLERIAN HORMONE: 0.88 NG/ML
BASOPHILS # BLD AUTO: 0.04 K/UL
BASOPHILS NFR BLD AUTO: 0.6 %
CYTOLOGY CVX/VAG DOC THIN PREP: NORMAL
EOSINOPHIL # BLD AUTO: 0.1 K/UL
EOSINOPHIL NFR BLD AUTO: 1.4 %
ESTRADIOL SERPL-MCNC: 47 PG/ML
FSH SERPL-MCNC: 5.4 IU/L
HCT VFR BLD CALC: 45.8 %
HGB BLD-MCNC: 14.4 G/DL
IMM GRANULOCYTES NFR BLD AUTO: 0.4 %
LYMPHOCYTES # BLD AUTO: 1.45 K/UL
LYMPHOCYTES NFR BLD AUTO: 20.5 %
MAN DIFF?: NORMAL
MCHC RBC-ENTMCNC: 26.3 PG
MCHC RBC-ENTMCNC: 31.4 G/DL
MCV RBC AUTO: 83.7 FL
MONOCYTES # BLD AUTO: 0.51 K/UL
MONOCYTES NFR BLD AUTO: 7.2 %
NEUTROPHILS # BLD AUTO: 4.96 K/UL
NEUTROPHILS NFR BLD AUTO: 69.9 %
PLATELET # BLD AUTO: 336 K/UL
PROLACTIN SERPL-MCNC: 12.9 NG/ML
RBC # BLD: 5.47 M/UL
RBC # FLD: 14.3 %
TSH SERPL-ACNC: 1.27 UIU/ML
WBC # FLD AUTO: 7.09 K/UL

## 2022-06-13 ENCOUNTER — APPOINTMENT (OUTPATIENT)
Dept: OBGYN | Facility: CLINIC | Age: 42
End: 2022-06-13
Payer: COMMERCIAL

## 2022-06-13 DIAGNOSIS — N93.9 ABNORMAL UTERINE AND VAGINAL BLEEDING, UNSPECIFIED: ICD-10-CM

## 2022-06-13 PROCEDURE — 58100 BIOPSY OF UTERUS LINING: CPT

## 2022-06-13 NOTE — PROCEDURE
[Endometrial Biopsy] : Endometrial biopsy [Consent Obtained] : Consent obtained [Irregular Bleeding] : irregular uterine bleeding [Risks] : risks [Benefits] : benefits [Alternatives] : alternatives [Patient] : patient [Infection] : infection [Bleeding] : bleeding [Allergic Reaction] : allergic reaction [Uterine Perforation] : uterine perforation [Negative] : negative pregnancy test [No Premedication] : No premedication [None] : none [Tenaculum] : Tenaculum [Easy Passage] : Easy passage [Sounded to ___ cm] : sounded to [unfilled] ~Ucm [Anteverted] : anteverted [Moderate] : moderate [Sent to Pathology] : placed in buffered formalin and sent for pathology [No Complications] : No complications

## 2022-06-13 NOTE — HISTORY OF PRESENT ILLNESS
[FreeTextEntry1] : 42 yo w/ AUB for endometrial biopsy today for AUB. upreg negative in office today.

## 2022-06-16 LAB — CORE LAB BIOPSY: NORMAL

## 2022-06-24 ENCOUNTER — APPOINTMENT (OUTPATIENT)
Dept: ANTEPARTUM | Facility: CLINIC | Age: 42
End: 2022-06-24

## 2022-07-25 NOTE — ED PROVIDER NOTE - NSFOLLOWUPINSTRUCTIONS_ED_ALL_ED_FT
Patient is provided with strainer and instructed to strain urine.   Dizziness    WHAT YOU NEED TO KNOW:    Dizziness is a feeling of being off balance or unsteady. Common causes of dizziness are an inner ear fluid imbalance or a lack of oxygen in your blood. Dizziness may be acute (lasts 3 days or less) or chronic (lasts longer than 3 days). You may have dizzy spells that last from seconds to a few hours.     DISCHARGE INSTRUCTIONS:    Return to the emergency department if:     You have a headache and a stiff neck.      You have shaking chills and a fever.       You vomit over and over with no relief.       Your vomit or bowel movements are red or black.       You have pain in your chest, back, or abdomen.       You have numbness, especially in your face, arms, or legs.       You have trouble moving your arms or legs.       You are confused.     Contact your healthcare provider if:     You have a fever.       Your symptoms do not get better with treatment.       You have questions or concerns about your condition or care.     Manage your symptoms:     Do not drive or operate heavy machinery when you are dizzy.       Get up slowly from sitting or lying down.       Drink plenty of liquids. Liquids help prevent dehydration. Ask how much liquid to drink each day and which liquids are best for you.    Follow up with your healthcare provider as directed: Write down your questions so you remember to ask them during your visits.        © Copyright [employer] 2019 All illustrations and images included in CareNotes are the copyrighted property of [employer] or [employer].

## 2022-08-05 RX ORDER — SODIUM PICOSULFATE, MAGNESIUM OXIDE, AND ANHYDROUS CITRIC ACID 10; 3.5; 12 MG/160ML; G/160ML; G/160ML
10-3.5-12 MG-GM LIQUID ORAL
Qty: 1 | Refills: 0 | Status: DISCONTINUED | COMMUNITY
Start: 2021-06-25 | End: 2022-08-05

## 2022-08-05 RX ORDER — PREDNISONE 20 MG/1
20 TABLET ORAL
Qty: 21 | Refills: 0 | Status: DISCONTINUED | COMMUNITY
Start: 2021-11-02 | End: 2022-08-05

## 2022-08-05 RX ORDER — SODIUM PICOSULFATE, MAGNESIUM OXIDE, AND ANHYDROUS CITRIC ACID 10; 3.5; 12 MG/160ML; G/160ML; G/160ML
10-3.5-12 MG-GM LIQUID ORAL
Qty: 1 | Refills: 0 | Status: DISCONTINUED | COMMUNITY
Start: 2021-05-20 | End: 2022-08-05

## 2022-08-05 RX ORDER — POLYETHYLENE GLYCOL 3350 17 G/17G
17 POWDER, FOR SOLUTION ORAL DAILY
Qty: 1 | Refills: 3 | Status: DISCONTINUED | COMMUNITY
Start: 2021-05-20 | End: 2022-08-05

## 2022-08-10 ENCOUNTER — OUTPATIENT (OUTPATIENT)
Dept: OUTPATIENT SERVICES | Facility: HOSPITAL | Age: 42
LOS: 1 days | Discharge: HOME | End: 2022-08-10

## 2022-08-10 ENCOUNTER — RESULT REVIEW (OUTPATIENT)
Age: 42
End: 2022-08-10

## 2022-08-10 DIAGNOSIS — M79.671 PAIN IN RIGHT FOOT: ICD-10-CM

## 2022-08-10 PROCEDURE — 76882 US LMTD JT/FCL EVL NVASC XTR: CPT | Mod: 26,RT

## 2022-08-11 ENCOUNTER — APPOINTMENT (OUTPATIENT)
Dept: GASTROENTEROLOGY | Facility: CLINIC | Age: 42
End: 2022-08-11

## 2022-08-11 DIAGNOSIS — D50.9 IRON DEFICIENCY ANEMIA, UNSPECIFIED: ICD-10-CM

## 2022-08-11 DIAGNOSIS — K58.9 IRRITABLE BOWEL SYNDROME W/OUT DIARRHEA: ICD-10-CM

## 2022-08-11 DIAGNOSIS — Z86.010 PERSONAL HISTORY OF COLONIC POLYPS: ICD-10-CM

## 2022-08-11 PROCEDURE — 99214 OFFICE O/P EST MOD 30 MIN: CPT | Mod: 95

## 2022-08-11 RX ORDER — DICYCLOMINE HYDROCHLORIDE 10 MG/1
10 CAPSULE ORAL 3 TIMES DAILY
Qty: 90 | Refills: 6 | Status: ACTIVE | COMMUNITY
Start: 2022-08-11 | End: 1900-01-01

## 2022-08-11 NOTE — HISTORY OF PRESENT ILLNESS
[Home] : at home, [unfilled] , at the time of the visit. [Medical Office: ([facility])___] : at the medical office located in  [Verbal consent obtained from patient] : the patient, [unfilled] [FreeTextEntry4] : Raya Agarwal  [de-identified] : 40 year old female patient average risk for CRC, with follicular lymphoma currently under monitoring, never treated, hx of iron deficiency anemia due to heavy menstrual periods s/p full GI work up which was unremarkable except for a colon SSA removed, next colono in 2024, now presents with chronic abdominal pain and bloating after food intake, associated with diarrheic episodes after which pain resolves. \par No night symptoms. No weight loss, blood in the stool, vomiting, or dysphagia. \par Reports GERD that wakes her up from sleep. \par

## 2022-08-11 NOTE — ASSESSMENT
[FreeTextEntry1] : 40 year old female patient average risk for CRC, with follicular lymphoma currently under monitoring, never treated, hx of iron deficiency anemia due to heavy menstrual periods s/p full GI work up which was unremarkable except for a colon SSA removed, next colono in 2024, now presents with chronic abdominal pain and bloating after food intake, associated with diarrheic episodes after which pain resolves. \par No night symptoms. No weight loss, blood in the stool, vomiting, or dysphagia. \par Reports GERD that wakes her up from sleep. \par \par Likely IBS-M\par Rec: bentyl tid PRN\par Low FODMAP diet\par Imodium PRN for diarrhea\par Colonoscopy for polyp surveiilance in 2024

## 2022-08-11 NOTE — PHYSICAL EXAM
[General Appearance - Alert] : alert [Hearing Threshold Finger Rub Not [place]] : hearing was normal [] : no respiratory distress [Skin Color & Pigmentation] : normal skin color and pigmentation [Oriented To Time, Place, And Person] : oriented to person, place, and time

## 2022-08-24 ENCOUNTER — APPOINTMENT (OUTPATIENT)
Dept: NEUROLOGY | Facility: CLINIC | Age: 42
End: 2022-08-24

## 2022-08-24 VITALS
OXYGEN SATURATION: 98 % | HEIGHT: 62 IN | BODY MASS INDEX: 27.05 KG/M2 | DIASTOLIC BLOOD PRESSURE: 77 MMHG | SYSTOLIC BLOOD PRESSURE: 111 MMHG | WEIGHT: 147 LBS | HEART RATE: 84 BPM

## 2022-08-24 DIAGNOSIS — R42 DIZZINESS AND GIDDINESS: ICD-10-CM

## 2022-08-24 DIAGNOSIS — H90.5 UNSPECIFIED SENSORINEURAL HEARING LOSS: ICD-10-CM

## 2022-08-24 DIAGNOSIS — R20.0 ANESTHESIA OF SKIN: ICD-10-CM

## 2022-08-24 PROCEDURE — 99213 OFFICE O/P EST LOW 20 MIN: CPT

## 2022-08-24 NOTE — PHYSICAL EXAM
[FreeTextEntry1] : Mental status: Awake, alert and oriented x3.  Recent and remote memory intact.  Naming, repetition and comprehension intact.  Attention/concentration intact.  No dysarthria, no aphasia.  Fund of knowledge appropriate.  \par Cranial nerves: Pupils equally round and reactive to light, visual fields full, no nystagmus, extraocular muscles intact, V1 through V3 intact bilaterally and symmetric, face symmetric, hearing intact to finger rub, palate elevation symmetric, tongue was midline.\par Motor:  MRC grading 5/5 b/l UE/LE.   strength 5/5.  Normal tone and bulk.  No abnormal movements.  \par Sensation: Intact to light touch, proprioception, and pinprick. Negative Tinel and Phalen test \par Coordination: No dysmetria on finger-to-nose and heel-to-shin.  No dysdiadokinesia.\par Reflexes: 2+ in bilateral UE/LE, downgoing toes bilaterally. (-) Bai.\par Gait: Narrow and steady. No ataxia.  Romberg negative\par \par

## 2022-08-24 NOTE — HISTORY OF PRESENT ILLNESS
[FreeTextEntry1] : EDMUND OLGUIN is a 41 year old woman with history of vertigo is here as a new patient for me. She used to follow with TOM Pugh for vertigo. MRI w IAC showed non specific white matter changes and normal IAC. Today she reported that she did not have any vertigo for past few months but still reports poor balance. In addition she was diagnosed with SNHL and was told that it was related to vertigo. She reports new symptoms of occasional numbness in the left hand in certain positions as she has to shake her hand. The numbness sometimes comes up to her left forearm. Denies neck pain. She was found to have tumor  in the left foot and underwent surgical removal.

## 2022-08-24 NOTE — ASSESSMENT
[FreeTextEntry1] : EDMUND OLGUIN is a 41 year old woman former patient of NP Xi is here as a new patient to me. Her vertigo symptoms are better but she still has poor balance and reports numbness in the left hand. Denies neck pain or lower back pain. Exam shows no evidence of neuropathy and negative Tinel and Phalen test.\par \par \par - Schedule the patient for EMG of two arms and one leg(90 min) to assess for neuropathy and CTS\par - Recommended to use wrist brace \par - Follow up with ENT for SNHL\par - RTC during the test

## 2022-10-12 ENCOUNTER — EMERGENCY (EMERGENCY)
Facility: HOSPITAL | Age: 42
LOS: 0 days | Discharge: HOME | End: 2022-10-12
Attending: EMERGENCY MEDICINE | Admitting: EMERGENCY MEDICINE

## 2022-10-12 VITALS
DIASTOLIC BLOOD PRESSURE: 90 MMHG | OXYGEN SATURATION: 100 % | RESPIRATION RATE: 20 BRPM | SYSTOLIC BLOOD PRESSURE: 128 MMHG | WEIGHT: 139.99 LBS | TEMPERATURE: 98 F | HEIGHT: 62 IN | HEART RATE: 105 BPM

## 2022-10-12 DIAGNOSIS — N83.202 UNSPECIFIED OVARIAN CYST, LEFT SIDE: ICD-10-CM

## 2022-10-12 DIAGNOSIS — Z20.822 CONTACT WITH AND (SUSPECTED) EXPOSURE TO COVID-19: ICD-10-CM

## 2022-10-12 DIAGNOSIS — R07.9 CHEST PAIN, UNSPECIFIED: ICD-10-CM

## 2022-10-12 DIAGNOSIS — R10.9 UNSPECIFIED ABDOMINAL PAIN: ICD-10-CM

## 2022-10-12 DIAGNOSIS — C85.90 NON-HODGKIN LYMPHOMA, UNSPECIFIED, UNSPECIFIED SITE: ICD-10-CM

## 2022-10-12 DIAGNOSIS — Z86.2 PERSONAL HISTORY OF DISEASES OF THE BLOOD AND BLOOD-FORMING ORGANS AND CERTAIN DISORDERS INVOLVING THE IMMUNE MECHANISM: ICD-10-CM

## 2022-10-12 LAB
ALBUMIN SERPL ELPH-MCNC: 4.1 G/DL — SIGNIFICANT CHANGE UP (ref 3.5–5.2)
ALP SERPL-CCNC: 71 U/L — SIGNIFICANT CHANGE UP (ref 30–115)
ALT FLD-CCNC: 17 U/L — SIGNIFICANT CHANGE UP (ref 0–41)
ANION GAP SERPL CALC-SCNC: 11 MMOL/L — SIGNIFICANT CHANGE UP (ref 7–14)
APTT BLD: 31.9 SEC — SIGNIFICANT CHANGE UP (ref 27–39.2)
AST SERPL-CCNC: 19 U/L — SIGNIFICANT CHANGE UP (ref 0–41)
BASOPHILS # BLD AUTO: 0.05 K/UL — SIGNIFICANT CHANGE UP (ref 0–0.2)
BASOPHILS NFR BLD AUTO: 0.7 % — SIGNIFICANT CHANGE UP (ref 0–1)
BILIRUB SERPL-MCNC: 0.3 MG/DL — SIGNIFICANT CHANGE UP (ref 0.2–1.2)
BUN SERPL-MCNC: 16 MG/DL — SIGNIFICANT CHANGE UP (ref 10–20)
CALCIUM SERPL-MCNC: 9.4 MG/DL — SIGNIFICANT CHANGE UP (ref 8.4–10.5)
CHLORIDE SERPL-SCNC: 102 MMOL/L — SIGNIFICANT CHANGE UP (ref 98–110)
CO2 SERPL-SCNC: 24 MMOL/L — SIGNIFICANT CHANGE UP (ref 17–32)
CREAT SERPL-MCNC: 0.9 MG/DL — SIGNIFICANT CHANGE UP (ref 0.7–1.5)
EGFR: 82 ML/MIN/1.73M2 — SIGNIFICANT CHANGE UP
EOSINOPHIL # BLD AUTO: 0.13 K/UL — SIGNIFICANT CHANGE UP (ref 0–0.7)
EOSINOPHIL NFR BLD AUTO: 1.7 % — SIGNIFICANT CHANGE UP (ref 0–8)
GLUCOSE SERPL-MCNC: 99 MG/DL — SIGNIFICANT CHANGE UP (ref 70–99)
HCG SERPL QL: NEGATIVE — SIGNIFICANT CHANGE UP
HCT VFR BLD CALC: 41.7 % — SIGNIFICANT CHANGE UP (ref 37–47)
HGB BLD-MCNC: 13.5 G/DL — SIGNIFICANT CHANGE UP (ref 12–16)
IMM GRANULOCYTES NFR BLD AUTO: 0.5 % — HIGH (ref 0.1–0.3)
INR BLD: 1.08 RATIO — SIGNIFICANT CHANGE UP (ref 0.65–1.3)
LACTATE SERPL-SCNC: 1.1 MMOL/L — SIGNIFICANT CHANGE UP (ref 0.7–2)
LIDOCAIN IGE QN: 33 U/L — SIGNIFICANT CHANGE UP (ref 7–60)
LYMPHOCYTES # BLD AUTO: 1.28 K/UL — SIGNIFICANT CHANGE UP (ref 1.2–3.4)
LYMPHOCYTES # BLD AUTO: 16.9 % — LOW (ref 20.5–51.1)
MAGNESIUM SERPL-MCNC: 1.8 MG/DL — SIGNIFICANT CHANGE UP (ref 1.8–2.4)
MCHC RBC-ENTMCNC: 26 PG — LOW (ref 27–31)
MCHC RBC-ENTMCNC: 32.4 G/DL — SIGNIFICANT CHANGE UP (ref 32–37)
MCV RBC AUTO: 80.2 FL — LOW (ref 81–99)
MONOCYTES # BLD AUTO: 0.59 K/UL — SIGNIFICANT CHANGE UP (ref 0.1–0.6)
MONOCYTES NFR BLD AUTO: 7.8 % — SIGNIFICANT CHANGE UP (ref 1.7–9.3)
NEUTROPHILS # BLD AUTO: 5.5 K/UL — SIGNIFICANT CHANGE UP (ref 1.4–6.5)
NEUTROPHILS NFR BLD AUTO: 72.4 % — SIGNIFICANT CHANGE UP (ref 42.2–75.2)
NRBC # BLD: 0 /100 WBCS — SIGNIFICANT CHANGE UP (ref 0–0)
PLATELET # BLD AUTO: 350 K/UL — SIGNIFICANT CHANGE UP (ref 130–400)
POTASSIUM SERPL-MCNC: 4.2 MMOL/L — SIGNIFICANT CHANGE UP (ref 3.5–5)
POTASSIUM SERPL-SCNC: 4.2 MMOL/L — SIGNIFICANT CHANGE UP (ref 3.5–5)
PROT SERPL-MCNC: 7 G/DL — SIGNIFICANT CHANGE UP (ref 6–8)
PROTHROM AB SERPL-ACNC: 12.4 SEC — SIGNIFICANT CHANGE UP (ref 9.95–12.87)
RBC # BLD: 5.2 M/UL — SIGNIFICANT CHANGE UP (ref 4.2–5.4)
RBC # FLD: 13.4 % — SIGNIFICANT CHANGE UP (ref 11.5–14.5)
SARS-COV-2 RNA SPEC QL NAA+PROBE: SIGNIFICANT CHANGE UP
SODIUM SERPL-SCNC: 137 MMOL/L — SIGNIFICANT CHANGE UP (ref 135–146)
TROPONIN T SERPL-MCNC: <0.01 NG/ML — SIGNIFICANT CHANGE UP
WBC # BLD: 7.59 K/UL — SIGNIFICANT CHANGE UP (ref 4.8–10.8)
WBC # FLD AUTO: 7.59 K/UL — SIGNIFICANT CHANGE UP (ref 4.8–10.8)

## 2022-10-12 PROCEDURE — 71275 CT ANGIOGRAPHY CHEST: CPT | Mod: 26,MA

## 2022-10-12 PROCEDURE — 74177 CT ABD & PELVIS W/CONTRAST: CPT | Mod: 26,MA

## 2022-10-12 PROCEDURE — 93010 ELECTROCARDIOGRAM REPORT: CPT

## 2022-10-12 PROCEDURE — 99285 EMERGENCY DEPT VISIT HI MDM: CPT

## 2022-10-12 PROCEDURE — 71045 X-RAY EXAM CHEST 1 VIEW: CPT | Mod: 26

## 2022-10-12 RX ORDER — SODIUM CHLORIDE 9 MG/ML
1000 INJECTION, SOLUTION INTRAVENOUS ONCE
Refills: 0 | Status: DISCONTINUED | OUTPATIENT
Start: 2022-10-12 | End: 2022-10-12

## 2022-10-12 NOTE — ED PROVIDER NOTE - PATIENT PORTAL LINK FT
You can access the FollowMyHealth Patient Portal offered by [facility] by registering at the following website: [email or web address]. By joining [facility]’s FollowMyHealth portal, you will also be able to view your health information using other applications (apps) compatible with our system.

## 2022-10-12 NOTE — ED PROVIDER NOTE - CARE PLAN
Principal Discharge DX:	Ovarian cyst  Secondary Diagnosis:	Abdominal pain  Secondary Diagnosis:	Chest pain   1

## 2022-10-12 NOTE — ED PROVIDER NOTE - ATTENDING APP SHARED VISIT CONTRIBUTION OF CARE
I was present for and supervised the key and critical aspects of the procedures performed during the care of the patient. Patient is a 41-year-old female presents emergency department for evaluation of chest pain as well as abdominal pain onset over the past weeks to months intermittently worsening since that time.  Patient is not a smoker she notes that there is no exertional component denies any diaphoresis denies any nausea vomiting or back pain states that is worse with sleeping on her side patient was recently diagnosed with a low-grade lymphoma not undergoing active treatment at this time this was diagnosed after routine mammography revealed lymph nodes swelling which was subsequently biopsied returning with lymphoma patient is unsure of the exact diagnosis the above information is related by the patient herself    On physical exam patient is normocephalic atraumatic pupils equally round react light commendation extraocular muscles intact oropharynx clear chest clear to auscultation bilaterally abdomen soft nontender and no guarding or rebound she has no CVA tenderness    Assessment plan patient presents for evaluation of chest pain and abdominal pain routine EKG which is not consistent with STEMI given her past risk factors we obtained troponin which is negative overall heart score is less than 5 given past history of lymphoma and chest pain we obtained CTA chest abdomen with PE protocol I will continue to monitor at this time

## 2022-10-12 NOTE — ED PROVIDER NOTE - CLINICAL SUMMARY MEDICAL DECISION MAKING FREE TEXT BOX
patient presents for evaluation of chest pain and abdominal pain routine EKG which is not consistent with STEMI given her past risk factors we obtained troponin which is negative overall heart score is less than 5 given past history of lymphoma and chest pain we obtained CTA chest abdomen with PE protocol I will continue to monitor at this time patient presents for evaluation of chest pain and abdominal pain routine EKG which is not consistent with STEMI given her past risk factors we obtained troponin which is negative overall heart score is less than 5 given past history of lymphoma and chest pain we obtained CTA chest abdomen with PE protocol I will continue to monitor at this time  Results reviewed and d/w pt and family member, Pt to f/u with PMD and OB/Gyn. Pt instructed to return if any worsening symptoms or concerns.  They verbalize understanding.

## 2022-10-24 ENCOUNTER — APPOINTMENT (OUTPATIENT)
Dept: OBGYN | Facility: CLINIC | Age: 42
End: 2022-10-24

## 2022-10-24 VITALS
WEIGHT: 147 LBS | SYSTOLIC BLOOD PRESSURE: 106 MMHG | DIASTOLIC BLOOD PRESSURE: 74 MMHG | HEIGHT: 62 IN | BODY MASS INDEX: 27.05 KG/M2

## 2022-10-24 DIAGNOSIS — N83.299 OTHER OVARIAN CYST, UNSPECIFIED SIDE: ICD-10-CM

## 2022-10-24 PROCEDURE — 76830 TRANSVAGINAL US NON-OB: CPT

## 2022-10-24 PROCEDURE — 99213 OFFICE O/P EST LOW 20 MIN: CPT | Mod: 25

## 2022-10-24 NOTE — HISTORY OF PRESENT ILLNESS
[FreeTextEntry1] : 42 yo P3 presents for f/u from ED for incidental finding of left ovarian cyst on CT abd/pelvis. Patient presents to ED on 10/12 w/ LUQ pain. She has known h/o lymphoma and CT revealed increasing lymphadenopathy and 7 CM LUQ  mass that was subsequently biopsed at [facility]. She also had a 1.8 cm hemorrhagic/crenated left ovarian cyst. She currently denies any LLQ pain. LMP 10/22/2022. \par She was had a sonogram that previously demonstrated likely adenomyosis and an benign endometrial biopsy. She reports her periods are lighter than previous and has no complaints.

## 2022-10-24 NOTE — PHYSICAL EXAM
[Appropriately responsive] : appropriately responsive [Soft] : soft [Non-tender] : non-tender [Non-distended] : non-distended [No Lesions] : no lesions [No Mass] : no mass

## 2022-10-24 NOTE — DISCUSSION/SUMMARY
[FreeTextEntry1] : 42 yo for f/u ED for left ovarian cyst\par - discussed likely functional nature of ovarian cyst imaged on CT on 10/12, with non visualization of left ovary on TVUS today.  Discussed as cyst was small and she is not having any pelvic pain there is no need for further imaging to be done, discussed if cyst were enlarged or there were any left sided pelvic masses it would be visible on sonogram today. Discussed left ovary may be obscured by bowel or small in size and unable to delineate from other pelvic structures. Discussed if starts having any pelvic pain or symptoms change advised to come in for an appointment\par - f/u 5/2023 for annual or PRN\par - patient to be started on chemotherapy at [facility] for lymphoma on 11/1

## 2022-10-24 NOTE — PROCEDURE
[Pelvic Mass] : pelvic mass [Transvaginal Ultrasound] : transvaginal ultrasound [Retroverted] : retroverted [No Fibroid(s)] : no fibroid(s) [L: ___ cm] : L: [unfilled] cm [W: ___cm] : W: [unfilled] cm [H: ___ cm] : H: [unfilled] cm [FreeTextEntry7] : 1.96 x 1.29 cm [FreeTextEntry5] : ES: 0.79cm [FreeTextEntry8] : not visualized [FreeTextEntry4] : normal TVUS, non visualization of left ovary [FreeTextEntry6] : cervix: 4.23 cm

## 2023-01-05 ENCOUNTER — APPOINTMENT (OUTPATIENT)
Dept: NEUROLOGY | Facility: CLINIC | Age: 43
End: 2023-01-05

## 2023-01-16 ENCOUNTER — EMERGENCY (EMERGENCY)
Facility: HOSPITAL | Age: 43
LOS: 0 days | Discharge: HOME | End: 2023-01-16
Attending: EMERGENCY MEDICINE | Admitting: EMERGENCY MEDICINE
Payer: COMMERCIAL

## 2023-01-16 VITALS
WEIGHT: 123.9 LBS | HEART RATE: 85 BPM | OXYGEN SATURATION: 99 % | TEMPERATURE: 99 F | HEIGHT: 62 IN | SYSTOLIC BLOOD PRESSURE: 138 MMHG | DIASTOLIC BLOOD PRESSURE: 82 MMHG | RESPIRATION RATE: 18 BRPM

## 2023-01-16 DIAGNOSIS — L29.2 PRURITUS VULVAE: ICD-10-CM

## 2023-01-16 DIAGNOSIS — N89.8 OTHER SPECIFIED NONINFLAMMATORY DISORDERS OF VAGINA: ICD-10-CM

## 2023-01-16 DIAGNOSIS — R30.0 DYSURIA: ICD-10-CM

## 2023-01-16 DIAGNOSIS — Z85.830 PERSONAL HISTORY OF MALIGNANT NEOPLASM OF BONE: ICD-10-CM

## 2023-01-16 DIAGNOSIS — C82.90 FOLLICULAR LYMPHOMA, UNSPECIFIED, UNSPECIFIED SITE: ICD-10-CM

## 2023-01-16 DIAGNOSIS — Z86.2 PERSONAL HISTORY OF DISEASES OF THE BLOOD AND BLOOD-FORMING ORGANS AND CERTAIN DISORDERS INVOLVING THE IMMUNE MECHANISM: ICD-10-CM

## 2023-01-16 LAB
APPEARANCE UR: CLEAR — SIGNIFICANT CHANGE UP
BACTERIA # UR AUTO: ABNORMAL
BILIRUB UR-MCNC: ABNORMAL
COLOR SPEC: YELLOW — SIGNIFICANT CHANGE UP
COMMENT - URINE: SIGNIFICANT CHANGE UP
DIFF PNL FLD: ABNORMAL
EPI CELLS # UR: ABNORMAL /HPF
GLUCOSE UR QL: NEGATIVE MG/DL — SIGNIFICANT CHANGE UP
KETONES UR-MCNC: NEGATIVE — SIGNIFICANT CHANGE UP
LEUKOCYTE ESTERASE UR-ACNC: ABNORMAL
NITRITE UR-MCNC: NEGATIVE — SIGNIFICANT CHANGE UP
PH UR: 6 — SIGNIFICANT CHANGE UP (ref 5–8)
PROT UR-MCNC: NEGATIVE MG/DL — SIGNIFICANT CHANGE UP
RBC CASTS # UR COMP ASSIST: ABNORMAL /HPF
SP GR SPEC: >=1.03 (ref 1.01–1.03)
UROBILINOGEN FLD QL: 0.2 MG/DL — SIGNIFICANT CHANGE UP
WBC UR QL: ABNORMAL /HPF

## 2023-01-16 PROCEDURE — 99284 EMERGENCY DEPT VISIT MOD MDM: CPT

## 2023-01-16 RX ORDER — FLUCONAZOLE 150 MG/1
1 TABLET ORAL
Qty: 2 | Refills: 0
Start: 2023-01-16

## 2023-01-16 RX ORDER — METRONIDAZOLE 500 MG
1 TABLET ORAL
Qty: 14 | Refills: 0
Start: 2023-01-16 | End: 2023-01-22

## 2023-01-16 NOTE — ED PROVIDER NOTE - NSFOLLOWUPINSTRUCTIONS_ED_ALL_ED_FT
You were evaluated in the Emergency Department today, and your visit did not reveal anything immediately life-threatening.    However, it is important that you follow-up with your PRIMARY CARE PHYSICIAN within 3-5 days.    ---------------------------------------------------------------------------------------------------    Prescription(s) were sent to your pharmacy.    ---------------------------------------------------------------------------------------------------    Vaginal Yeast Infection, Adult    Female body with a close-up showing the vagina with a yeast infection.   Vaginal yeast infection is a condition that causes vaginal discharge as well as soreness, swelling, and redness (inflammation) of the vagina. This is a common condition. Some women get this infection frequently.    What are the causes?  This condition is caused by a change in the normal balance of the yeast (Candida) and normal bacteria that live in the vagina. This change causes an overgrowth of yeast, which causes the inflammation.    What increases the risk?  The condition is more likely to develop in women who:  - Take antibiotic medicines.  - Have diabetes.  - Take birth control pills.  - Are pregnant.  - Douche often.  - Have a weak body defense system (immune system).  - Have been taking steroid medicines for a long time.  - Frequently wear tight clothing.    What are the signs or symptoms?  Symptoms of this condition include:  - White, thick, creamy vaginal discharge.  - Swelling, itching, redness, and irritation of the vagina. The lips of the vagina (labia) may be affected as well.  - Pain or a burning feeling while urinating.  - Pain during sex.    How is this treated?  This condition is treated with medicine. Medicines may be over-the-counter or prescription. You may be told to use one or more of the following:  - Medicine that is taken by mouth (orally).  - Medicine that is applied as a cream (topically).  - Medicine that is inserted directly into the vagina (suppository).    Follow these instructions at home:  - Take or apply over-the-counter and prescription medicines only as told by your health care provider.  -  Do not use tampons until your health care provider approves.  -  Do not have sex until your infection has cleared. Sex can prolong or worsen your symptoms of infection. Ask your health care provider when it is safe to resume sexual activity.  - Keep all follow-up visits. This is important.    How is this prevented?  A sign showing that a person should not douche.   -  Do not wear tight clothes, such as pantyhose or tight pants.  - Wear breathable cotton underwear.  -  Do not use douches, perfumed soap, creams, or powders.  - Wipe from front to back after using the toilet.  - If you have diabetes, keep your blood sugar levels under control.  - Ask your health care provider for other ways to prevent yeast infections.    Contact a health care provider if:  - You have a fever.  - Your symptoms go away and then return.  - Your symptoms do not get better with treatment.  - Your symptoms get worse.  - You have new symptoms.  - You develop blisters in or around your vagina.  - You have blood coming from your vagina and it is not your menstrual period.  - You develop pain in your abdomen.    Summary  - Vaginal yeast infection is a condition that causes discharge as well as soreness, swelling, and redness (inflammation) of the vagina.  - This condition is treated with medicine. Medicines may be over-the-counter or prescription.  - Take or apply over-the-counter and prescription medicines only as told by your health care provider.  -  Do not douche. Resume sexual activity or use of tampons as instructed by your health care provider.  - Contact a health care provider if your symptoms do not get better with treatment or your symptoms go away and then return.    This information is not intended to replace advice given to you by your health care provider. Make sure you discuss any questions you have with your health care provider.    [employer] Patient Education © 2022 [employer]      ---------------------------------------------------------------------------------------------------    Bacterial Vaginosis  Bacterial vaginosis is an infection that occurs when the normal balance of bacteria in the vagina changes. This change is caused by an overgrowth of certain bacteria in the vagina. Bacterial vaginosis is the most common vaginal infection among females aged 15 to 44 years.  This condition increases the risk of sexually transmitted infections (STIs). Treatment can help reduce this risk. Treatment is very important for pregnant women because this condition can cause babies to be born early (prematurely) or at a low birth weight.    What are the causes?  This condition is caused by an increase in harmful bacteria that are normally present in small amounts in the vagina. However, the exact reason this condition develops is not known.  You cannot get bacterial vaginosis from toilet seats, bedding, swimming pools, or contact with objects around you.    What increases the risk?  The following factors may make you more likely to develop this condition:  •Having a new sexual partner or multiple sexual partners, or having unprotected sex.  •Douching.  •Having an intrauterine device (IUD).  •Smoking.  •Abusing drugs and alcohol. This may lead to riskier sexual behavior.  •Taking certain antibiotic medicines.  •Being pregnant.    What are the signs or symptoms?  Some women with this condition have no symptoms. Symptoms may include:  •Gray or white vaginal discharge. The discharge can be watery or foamy.  •A fish-like odor with discharge, especially after sex or during menstruation.  •Itching in and around the vagina.  •Burning or pain with urination.    How is this diagnosed?  This condition is diagnosed based on:  •Your medical history.  •A physical exam of the vagina.  •Checking a sample of vaginal fluid for harmful bacteria or abnormal cells.  How is this treated?  This condition is treated with antibiotic medicines. These may be given as a pill, a vaginal cream, or a medicine that is put into the vagina (suppository). If the condition comes back after treatment, a second round of antibiotics may be needed.    Follow these instructions at home:  Medicines   •Take or apply over-the-counter and prescription medicines only as told by your health care provider.  •Take or apply your antibiotic medicine as told by your health care provider. Do not stop using the antibiotic even if you start to feel better.    General instructions   •If you have a female sexual partner, tell her that you have a vaginal infection. She should follow up with her health care provider. If you have a male sexual partner, he does not need treatment.  •Avoid sexual activity until you finish treatment.  •Drink enough fluid to keep your urine pale yellow.  •Keep the area around your vagina and rectum clean.  •Wash the area daily with warm water.  •Wipe yourself from front to back after using the toilet.  •If you are breastfeeding, talk to your health care provider about continuing breastfeeding during treatment.  •Keep all follow-up visits. This is important.    How is this prevented?  Self-care   •Do not douche.  •Wash the outside of your vagina with warm water only.  •Wear cotton or cotton-lined underwear.  •Avoid wearing tight pants and pantyhose, especially during the summer.    Safe sex   •Use protection when having sex. This includes:  •Using condoms.  •Using dental dams. This is a thin layer of a material made of latex or polyurethane that protects the mouth during oral sex.  •Limit the number of sexual partners. To help prevent bacterial vaginosis, it is best to have sex with just one partner (monogamous relationship).  •Make sure you and your sexual partner are tested for STIs.    Drugs and alcohol   • Do not use any products that contain nicotine or tobacco. These products include cigarettes, chewing tobacco, and vaping devices, such as e-cigarettes. If you need help quitting, ask your health care provider.  •Do not use drugs.  •Do not drink alcohol if:  •Your health care provider tells you not to do this.  •You are pregnant, may be pregnant, or are planning to become pregnant.  •If you drink alcohol:  •Limit how much you have to 0–1 drink a day.   •Be aware of how much alcohol is in your drink. In the [place] one drink equals one 12 oz bottle of beer (355 mL), one 5 oz glass of wine (148 mL), or one 1½ oz glass of hard liquor (44 mL).  Where to find more information  •Centers for Disease Control and Prevention: www.cdc.gov  •American Sexual Health Association (MANJEET): www.ashastd.org  •[place] Department of Health and Human Services, Office on Women's Health: www.womenshealth.gov    Contact a health care provider if:  •Your symptoms do not improve, even after treatment.  •You have more discharge or pain when urinating.  •You have a fever or chills.  •You have pain in your abdomen or pelvis.  •You have pain during sex.  •You have vaginal bleeding between menstrual periods.    Summary  •Bacterial vaginosis is a vaginal infection that occurs when the normal balance of bacteria in the vagina changes. It results from an overgrowth of certain bacteria.  •This condition increases the risk of sexually transmitted infections (STIs). Getting treated can help reduce this risk.  •Treatment is very important for pregnant women because this condition can cause babies to be born early (prematurely) or at low birth weight.  •This condition is treated with antibiotic medicines. These may be given as a pill, a vaginal cream, or a medicine that is put into the vagina (suppository).    This information is not intended to replace advice given to you by your health care provider. Make sure you discuss any questions you have with your health care provider. You were evaluated in the Emergency Department today, and your visit did not reveal anything immediately life-threatening.    However, it is important that you follow-up with your PRIMARY CARE PHYSICIAN within 3-5 days.    ---------------------------------------------------------------------------------------------------    Prescription(s) were sent to your pharmacy.    ---------------------------------------------------------------------------------------------------    Bacterial Vaginosis    Bacterial vaginosis is an infection that occurs when the normal balance of bacteria in the vagina changes. This change is caused by an overgrowth of certain bacteria in the vagina. Bacterial vaginosis is the most common vaginal infection among females aged 15 to 44 years.  This condition increases the risk of sexually transmitted infections (STIs). Treatment can help reduce this risk. Treatment is very important for pregnant women because this condition can cause babies to be born early (prematurely) or at a low birth weight.    What are the causes?  This condition is caused by an increase in harmful bacteria that are normally present in small amounts in the vagina. However, the exact reason this condition develops is not known.  You cannot get bacterial vaginosis from toilet seats, bedding, swimming pools, or contact with objects around you.    What are the signs or symptoms?  Some women with this condition have no symptoms. Symptoms may include:   - Gray or white vaginal discharge. The discharge can be watery or foamy.   - A fish-like odor with discharge, especially after sex or during menstruation.   - Itching in and around the vagina.   - Burning or pain with urination.    How is this treated?  This condition is treated with antibiotic medicines. These may be given as a pill, a vaginal cream, or a medicine that is put into the vagina (suppository). If the condition comes back after treatment, a second round of antibiotics may be needed.    Follow these instructions at home:  Medicines    - Take or apply over-the-counter and prescription medicines only as told by your health care provider.   - Take or apply your antibiotic medicine as told by your health care provider. Do not stop using the antibiotic even if you start to feel better.    How is this prevented?  Self-care    - Do not douche.   - Wash the outside of your vagina with warm water only.   - Wear cotton or cotton-lined underwear.   - Avoid wearing tight pants and pantyhose, especially during the summer.    General instructions    - If you have a female sexual partner, tell her that you have a vaginal infection. She should follow up with her health care provider. If you have a male sexual partner, he does not need treatment.   - Avoid sexual activity until you finish treatment.   - Drink enough fluid to keep your urine pale yellow.   - Keep the area around your vagina and rectum clean.   - Wash the area daily with warm water.   - Wipe yourself from front to back after using the toilet.   - If you are breastfeeding, talk to your health care provider about continuing breastfeeding during treatment.   - Keep all follow-up visits. This is important.    Contact a health care provider if:   - Your symptoms do not improve, even after treatment.   - You have more discharge or pain when urinating.   - You have a fever or chills.   - You have pain in your abdomen or pelvis.   - You have pain during sex.   - You have vaginal bleeding between menstrual periods.    Summary   - Bacterial vaginosis is a vaginal infection that occurs when the normal balance of bacteria in the vagina changes. It results from an overgrowth of certain bacteria.   - This condition increases the risk of sexually transmitted infections (STIs). Getting treated can help reduce this risk.   - Treatment is very important for pregnant women because this condition can cause babies to be born early (prematurely) or at low birth weight.   - This condition is treated with antibiotic medicines. These may be given as a pill, a vaginal cream, or a medicine that is put into the vagina (suppository).      ---------------------------------------------------------------------------------------------------    Vaginal Yeast Infection, Adult    Vaginal yeast infection is a condition that causes vaginal discharge as well as soreness, swelling, and redness (inflammation) of the vagina. This is a common condition. Some women get this infection frequently.    What are the causes?  This condition is caused by a change in the normal balance of the yeast (Candida) and normal bacteria that live in the vagina. This change causes an overgrowth of yeast, which causes the inflammation.    What increases the risk?  The condition is more likely to develop in women who:  - Take antibiotic medicines.  - Have diabetes.  - Take birth control pills.  - Are pregnant.  - Douche often.  - Have a weak body defense system (immune system).  - Have been taking steroid medicines for a long time.  - Frequently wear tight clothing.    What are the signs or symptoms?  Symptoms of this condition include:  - White, thick, creamy vaginal discharge.  - Swelling, itching, redness, and irritation of the vagina. The lips of the vagina (labia) may be affected as well.  - Pain or a burning feeling while urinating.  - Pain during sex.    How is this treated?  This condition is treated with medicine. Medicines may be over-the-counter or prescription. You may be told to use one or more of the following:  - Medicine that is taken by mouth (orally).  - Medicine that is applied as a cream (topically).  - Medicine that is inserted directly into the vagina (suppository).    Follow these instructions at home:  - Take or apply over-the-counter and prescription medicines only as told by your health care provider.  -  Do not use tampons until your health care provider approves.  -  Do not have sex until your infection has cleared. Sex can prolong or worsen your symptoms of infection. Ask your health care provider when it is safe to resume sexual activity.  - Keep all follow-up visits. This is important.    How is this prevented?  - Do not wear tight clothes, such as pantyhose or tight pants.  - Wear breathable cotton underwear.  - Do not use douches, perfumed soap, creams, or powders.  - Wipe from front to back after using the toilet.  - If you have diabetes, keep your blood sugar levels under control.  - Ask your health care provider for other ways to prevent yeast infections.    Contact a health care provider if:  - You have a fever.  - Your symptoms go away and then return.  - Your symptoms do not get better with treatment.  - Your symptoms get worse.  - You have new symptoms.  - You develop blisters in or around your vagina.  - You have blood coming from your vagina and it is not your menstrual period.  - You develop pain in your abdomen.    Summary  - Vaginal yeast infection is a condition that causes discharge as well as soreness, swelling, and redness (inflammation) of the vagina.  - This condition is treated with medicine. Medicines may be over-the-counter or prescription.  - Take or apply over-the-counter and prescription medicines only as told by your health care provider.  -  Do not douche. Resume sexual activity or use of tampons as instructed by your health care provider.  - Contact a health care provider if your symptoms do not get better with treatment or your symptoms go away and then return.    [employer] Patient Education © 2022 [employer]

## 2023-01-16 NOTE — ED PROVIDER NOTE - OBJECTIVE STATEMENT
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 Patient presents for vaginal discharge for two days. The discharge is yellow-gilbert and colored, associated with dysuria, and vulvovaginal pruritus. Patient is sexually active with her spouse, with whom she is exclusive. No prior sexually transmitted infections or concerns for sexually transmitted infections. Patient sees Dr. Vidal Calderón at [facility] for oncology, receives chemotherapy every four weeks for two days at a time, and is on third out of six treatments. Never smoker. Patient is a 42-year-old female with a history of PVNS giant cell tumor, anemia, non-Hodgkin’s follicular lymphoma on chemotherapy, never smoker. Patient presents for vaginal discharge for 2 days.     The discharge is yellow-greenish in color, associated with dysuria, and vulvovaginal pruritus. Patient is sexually active with her  with whom she is exclusive. No prior sexually transmitted infections or concerns for sexually transmitted infections. No frequency, urgency, hesitancy, or difficulty emptying bladder. No genital lesions. No other complaints - no fever/chills, rhinorrhea, sore throat, CP, palpitations, SOB, cough, abd pain, NVD, new joint pain, FND, rash, trauma, melena, hematochezia.    Patient sees Dr. Vidal Calderón at [facility] for oncology, receives chemotherapy every 4 weeks for 2 days at a time, and is on 3rd out of 6 treatments.

## 2023-01-16 NOTE — ED ADULT TRIAGE NOTE - CHIEF COMPLAINT QUOTE
Patient complaining of yellowish vaginal discharge that started yesterday morning. Patient states she is undergoing chemotherapy

## 2023-01-16 NOTE — ED PROVIDER NOTE - PHYSICAL EXAMINATION
_  Vital signs reviewed; ABCs intact  GENERAL: Well nourished, comfortable  SKIN: Warm, dry  HEAD & NECK: NCAT, supple neck  EYES: EOMI, PER B/L  ENT: MMM  CARD: RRR, S1, S2; no murmurs, no rubs, no gallops  RESP: Normal respiratory effort, CTAB, no rales, no wheezing  ABD: Soft, ND, NT, no rebound, no guarding; no CVAT  PELVIC: Normal female external genitalia, no bleeding from introitus, normal vaginal epithelium, +whitish-yellow discharge in the vaginal vault; closed cervical os, no CMT, no adnexal tenderness or masses (performed in the presence of Dr. Oconnell)  EXT: Pulses palpable distally  NEUROMSK: Grossly intact  PSYCH: AAOx3, cooperative, appropriate

## 2023-01-16 NOTE — ED PROVIDER NOTE - CLINICAL SUMMARY MEDICAL DECISION MAKING FREE TEXT BOX
Pts symptoms most consistent with BV.  will treat.  Pt to f/u with her Gyn. Pt instructed to return if any worsening symptoms or concerns.  They verbalize understanding.

## 2023-01-16 NOTE — ED PROVIDER NOTE - PROGRESS NOTE DETAILS
Resident AO: Exam suggestive of bacterial vaginosis +/- candidiasis. Discussed management of both, and sent prescriptions. Swab and UA sent. All questions answered, patient expressed understanding, and agreed with plan. Return precautions given.

## 2023-01-16 NOTE — ED ADULT NURSE NOTE - PRIMARY CARE PROVIDER
----- Message from Briseida Sanabria MA sent at 8/11/2020 10:37 AM CDT -----  Regarding: Call Again to schedule 6 month ff/u  MD Mary Reddy Jr., RN; Briseida Sanabria MA         Please let patient know thatI have refilled her gabapentin.  It does not seem that she has seen her primary care physician or myself for about 1 year.  Would best to have her evaluated by myself or her primary care physician if we are going to continue this medication.  Her primary care physician can refill this medication in the future if there is no other reason for her to follow up with me.        Dr Bergman

## 2023-01-16 NOTE — ED PROVIDER NOTE - PATIENT PORTAL LINK FT
You can access the FollowMyHealth Patient Portal offered by [facility] by registering at the following website: [email or web address]. By joining [email or web address]’s FollowMyHealth portal, you will also be able to view your health information using other applications (apps) compatible with our system.

## 2023-01-16 NOTE — ED PROVIDER NOTE - ATTENDING CONTRIBUTION TO CARE
41 yo F PMHx noted presents for evaluation of vaginal d/c, itching and burning for the last 2 days. no fevers, ni abd pain, no recent abx use. Pt not concerned for STI. On exam pt in NAD AAO x 3, seen ambulate with steady gait, abd soft nt nd,  with nml ext genitalia, no CMT, + vaginal d/c, no lesions

## 2023-01-17 LAB
CULTURE RESULTS: NO GROWTH — SIGNIFICANT CHANGE UP
SPECIMEN SOURCE: SIGNIFICANT CHANGE UP

## 2023-01-18 LAB
CULTURE RESULTS: SIGNIFICANT CHANGE UP
SPECIMEN SOURCE: SIGNIFICANT CHANGE UP

## 2023-02-06 PROBLEM — C85.90 NON-HODGKIN LYMPHOMA, UNSPECIFIED, UNSPECIFIED SITE: Chronic | Status: ACTIVE | Noted: 2023-01-16

## 2023-02-07 ENCOUNTER — APPOINTMENT (OUTPATIENT)
Dept: OBGYN | Facility: CLINIC | Age: 43
End: 2023-02-07
Payer: COMMERCIAL

## 2023-02-07 VITALS
BODY MASS INDEX: 26.5 KG/M2 | HEIGHT: 62 IN | SYSTOLIC BLOOD PRESSURE: 99 MMHG | WEIGHT: 144 LBS | DIASTOLIC BLOOD PRESSURE: 65 MMHG

## 2023-02-07 DIAGNOSIS — N76.0 ACUTE VAGINITIS: ICD-10-CM

## 2023-02-07 PROCEDURE — 99213 OFFICE O/P EST LOW 20 MIN: CPT

## 2023-02-07 NOTE — HISTORY OF PRESENT ILLNESS
[FreeTextEntry1] : 41 yo w/ follicular lymphoma on chemotherapy for acute vaginitis. Reports symptoms x 2 days, vaginal discharge, denies itching, burning, pain or odor. Report similar symptoms 1 week ago, went to ED was given fluconazole and PO metronidazole, symptoms improved but then returned. Vaginal culture from ED showing Gardnarella, negative Ucx. \par She is taking Bandamustine, rituximab, and bactrim and valtrex for infection prophylaxis.

## 2023-03-14 NOTE — ASU DISCHARGE PLAN (ADULT/PEDIATRIC) - A. DRIVE A CAR, OPERATE POWER TOOLS OR MACHINERY
----- Message from Steve Torres MD sent at 3/14/2023  3:34 PM CDT -----  I'd like to see him for follow-up earlier than his currently scheduled appointment on 3/29/23 to discuss CT results and further management. I have several available procedure openings next week, you may use any of them. If he accepts, cancel his appointment for 3/29.     Thanks,    Antonella Monterroso
S/W pt and informed him of the msg as stated below from [facility] and I arranged an appt for Wed. 3/22 at 9 am.
Statement Selected

## 2023-05-31 ENCOUNTER — APPOINTMENT (OUTPATIENT)
Dept: OBGYN | Facility: CLINIC | Age: 43
End: 2023-05-31
Payer: COMMERCIAL

## 2023-05-31 VITALS
WEIGHT: 147 LBS | BODY MASS INDEX: 27.05 KG/M2 | DIASTOLIC BLOOD PRESSURE: 84 MMHG | SYSTOLIC BLOOD PRESSURE: 116 MMHG | HEIGHT: 62 IN

## 2023-05-31 DIAGNOSIS — N76.0 ACUTE VAGINITIS: ICD-10-CM

## 2023-05-31 DIAGNOSIS — N91.1 SECONDARY AMENORRHEA: ICD-10-CM

## 2023-05-31 PROCEDURE — 99396 PREV VISIT EST AGE 40-64: CPT

## 2023-05-31 PROCEDURE — 76830 TRANSVAGINAL US NON-OB: CPT

## 2023-05-31 PROCEDURE — 99213 OFFICE O/P EST LOW 20 MIN: CPT | Mod: 25

## 2023-05-31 RX ORDER — MEDROXYPROGESTERONE ACETATE 10 MG/1
10 TABLET ORAL DAILY
Qty: 10 | Refills: 0 | Status: ACTIVE | COMMUNITY
Start: 2023-05-31 | End: 1900-01-01

## 2023-05-31 RX ORDER — CLOTRIMAZOLE AND BETAMETHASONE DIPROPIONATE 10; .5 MG/G; MG/G
1-0.05 CREAM TOPICAL TWICE DAILY
Qty: 1 | Refills: 2 | Status: ACTIVE | COMMUNITY
Start: 2023-02-14 | End: 1900-01-01

## 2023-05-31 NOTE — PROCEDURE
[Amenorrhea] : Amenorrhea [Transvaginal Ultrasound] : transvaginal ultrasound [Anteverted] : anteverted [No Fibroid(s)] : no fibroid(s) [L: ___ cm] : L: [unfilled] cm [W: ___cm] : W: [unfilled] cm [H: ___ cm] : H: [unfilled] cm [FreeTextEntry5] : ES: 1.40 [FreeTextEntry7] : 2.12 x 1.74 cm [FreeTextEntry8] : 3.11 x 2.61 cm [FreeTextEntry6] : Cervix: 3.07cm, nabothian cyst [FreeTextEntry4] : normal TVUS

## 2023-05-31 NOTE — DISCUSSION/SUMMARY
[FreeTextEntry1] : 41 yo w/ secondary amenorrheal, vaginitis. \par - f/u pap and HPV, aptima, urine culture\par - will discuss PET CT w/ GYN/onc\par - referral give for mammogram screening.

## 2023-05-31 NOTE — HISTORY OF PRESENT ILLNESS
[FreeTextEntry1] : 43 yo P3 for WWE and c/o vaginal discharge, irritation, burning w/ urination and secondary amenorrhea. LMP 3/2023. Reports prior to that period were regular every month. She did have a remote history 1 year ago of skipping 1 period and labs at that time were WNL. \par She has a history of follicular lymphoma, recently treated w/ chemotherapy, stopped 3/2023. \par Previously was having painful heavy periods, TVUS showing signs c/w adenomyosis. Endometrial biopsy from 10/2022 showing benign proliferative tissue. \par Last pap smear 5/2022: NILM, HPV negative\par \par She had a recent PET CT showing: "persistent mild heterogenous FDG activity in the enlarged cervix SUV 3.3. FDG in endometrium, adnexa and vagina are probably physiologic.

## 2023-06-19 ENCOUNTER — NON-APPOINTMENT (OUTPATIENT)
Age: 43
End: 2023-06-19

## 2023-06-19 LAB
A VAGINAE DNA VAG QL NAA+PROBE: NORMAL
BACTERIA UR CULT: NORMAL
BVAB2 DNA VAG QL NAA+PROBE: NORMAL
C KRUSEI DNA VAG QL NAA+PROBE: NEGATIVE
C TRACH RRNA SPEC QL NAA+PROBE: NEGATIVE
CANDIDA DNA VAG QL NAA+PROBE: NEGATIVE
CYTOLOGY CVX/VAG DOC THIN PREP: NORMAL
HPV HIGH+LOW RISK DNA PNL CVX: NOT DETECTED
MEGA1 DNA VAG QL NAA+PROBE: NORMAL
N GONORRHOEA RRNA SPEC QL NAA+PROBE: NEGATIVE
T VAGINALIS RRNA SPEC QL NAA+PROBE: NEGATIVE

## 2023-06-26 ENCOUNTER — NON-APPOINTMENT (OUTPATIENT)
Age: 43
End: 2023-06-26

## 2023-06-26 ENCOUNTER — APPOINTMENT (OUTPATIENT)
Dept: OBGYN | Facility: CLINIC | Age: 43
End: 2023-06-26
Payer: COMMERCIAL

## 2023-06-26 VITALS
HEIGHT: 62 IN | BODY MASS INDEX: 27.05 KG/M2 | SYSTOLIC BLOOD PRESSURE: 110 MMHG | DIASTOLIC BLOOD PRESSURE: 83 MMHG | WEIGHT: 147 LBS

## 2023-06-26 DIAGNOSIS — N88.9 NONINFLAMMATORY DISORDER OF CERVIX UTERI, UNSPECIFIED: ICD-10-CM

## 2023-06-26 PROCEDURE — 99214 OFFICE O/P EST MOD 30 MIN: CPT

## 2023-06-26 NOTE — HISTORY OF PRESENT ILLNESS
[FreeTextEntry1] : 43 yo for follow up for possible colposcopy today. Patient has records of previous PET/CT's from prior to chemotherapy, during chemotherapy and after chemotherapy. \par 4/2022: pre-treatment: cervix w/ hypermetabolic activity 5.7 SUV\par 12/2022: mid treatment: cervix w/ 3.3 SUV\par 4/2022: post treatment: unremarkable pelvic structures, cervix not specifically commented on.

## 2023-06-26 NOTE — DISCUSSION/SUMMARY
[FreeTextEntry1] : 43 yo w/ h/o follicular lymphoma s/p treatment, cervical uptake on PET CT for referral for GYN/onc\par - patient initially presented for colposcopy but after review of further PET/CT patient has with her today and now with apparent resolution of cervical abnormality on most recent PET/CT unsure of yeild for colposcopy in office\par - referral given to GYN/ONC, patient desires to f/u with GYN/onc at [facility] where she receives of her oncology care.

## 2023-06-29 ENCOUNTER — NON-APPOINTMENT (OUTPATIENT)
Age: 43
End: 2023-06-29

## 2023-08-21 RX ORDER — FLUCONAZOLE 150 MG/1
150 TABLET ORAL
Qty: 1 | Refills: 3 | Status: ACTIVE | COMMUNITY
Start: 2023-08-21 | End: 1900-01-01

## 2023-08-28 ENCOUNTER — APPOINTMENT (OUTPATIENT)
Dept: OBGYN | Facility: CLINIC | Age: 43
End: 2023-08-28
Payer: COMMERCIAL

## 2023-08-28 VITALS
WEIGHT: 148 LBS | BODY MASS INDEX: 27.23 KG/M2 | HEIGHT: 62 IN | DIASTOLIC BLOOD PRESSURE: 65 MMHG | SYSTOLIC BLOOD PRESSURE: 130 MMHG

## 2023-08-28 DIAGNOSIS — B96.89 ACUTE VAGINITIS: ICD-10-CM

## 2023-08-28 DIAGNOSIS — N76.0 ACUTE VAGINITIS: ICD-10-CM

## 2023-08-28 PROCEDURE — 99213 OFFICE O/P EST LOW 20 MIN: CPT

## 2023-08-28 RX ORDER — METRONIDAZOLE 7.5 MG/G
0.75 GEL VAGINAL
Qty: 1 | Refills: 3 | Status: ACTIVE | COMMUNITY
Start: 2023-08-28 | End: 1900-01-01

## 2023-08-28 RX ORDER — METRONIDAZOLE 7.5 MG/G
0.75 GEL VAGINAL
Qty: 1 | Refills: 1 | Status: ACTIVE | COMMUNITY
Start: 2023-08-28 | End: 1900-01-01

## 2023-08-28 NOTE — PHYSICAL EXAM
[Labia Majora] : normal [Labia Minora] : normal [Normal] : normal [Discharge] : a  ~M vaginal discharge was present [Moderate] : moderate [Bari] : yellow [Thin] : thin [No Bleeding] : There was no active vaginal bleeding [FreeTextEntry5] : + erythema

## 2023-08-28 NOTE — HISTORY OF PRESENT ILLNESS
[FreeTextEntry1] : 43 yo for vaginitis symptoms. Reports thick yellow green vaginal discharge, itching and burning. Sx improve with menses and then reoccur after. She used fluconazole 2x w/o symptoms relief. Denies any recent antibiotic use.

## 2023-08-28 NOTE — DISCUSSION/SUMMARY
[FreeTextEntry1] : 41 yo w/ recurrent BV -rx given metrogel and refills for supression - discussed follow up w/ GYN onc for h/o FDG avidity in cervix on PET/CT's. Reports she called and was told she did not need any further work up. Referral information given for Dr. Lawson

## 2023-09-01 DIAGNOSIS — R39.9 UNSPECIFIED SYMPTOMS AND SIGNS INVOLVING THE GENITOURINARY SYSTEM: ICD-10-CM

## 2023-09-01 LAB — BACTERIA UR CULT: ABNORMAL

## 2023-09-01 RX ORDER — FLUCONAZOLE 150 MG/1
150 TABLET ORAL
Qty: 1 | Refills: 3 | Status: ACTIVE | COMMUNITY
Start: 2023-09-01 | End: 1900-01-01

## 2023-09-01 RX ORDER — CEPHALEXIN 500 MG/1
500 CAPSULE ORAL
Qty: 14 | Refills: 0 | Status: ACTIVE | COMMUNITY
Start: 2023-09-01 | End: 1900-01-01

## 2023-09-01 RX ORDER — METRONIDAZOLE 7.5 MG/G
0.75 GEL VAGINAL
Qty: 1 | Refills: 1 | Status: ACTIVE | COMMUNITY
Start: 2023-09-01 | End: 1900-01-01

## 2023-09-11 DIAGNOSIS — Z01.419 ENCOUNTER FOR GYNECOLOGICAL EXAMINATION (GENERAL) (ROUTINE) W/OUT ABNORMAL FINDINGS: ICD-10-CM

## 2023-09-18 LAB — BACTERIA UR CULT: NORMAL

## 2024-10-07 ENCOUNTER — APPOINTMENT (OUTPATIENT)
Dept: ORTHOPEDIC SURGERY | Facility: CLINIC | Age: 44
End: 2024-10-07
Payer: MEDICARE

## 2024-10-07 VITALS — HEIGHT: 62 IN | WEIGHT: 145 LBS | BODY MASS INDEX: 26.68 KG/M2 | RESPIRATION RATE: 16 BRPM

## 2024-10-07 DIAGNOSIS — G56.03 CARPAL TUNNEL SYNDROM,BILATERAL UPPER LIMBS: ICD-10-CM

## 2024-10-07 PROCEDURE — 73110 X-RAY EXAM OF WRIST: CPT | Mod: 50

## 2024-10-07 PROCEDURE — 99204 OFFICE O/P NEW MOD 45 MIN: CPT

## 2024-10-15 RX ORDER — CHLORHEXIDINE GLUCONATE ORAL RINSE 1.2 MG/ML
1 SOLUTION DENTAL DAILY
Refills: 0 | Status: DISCONTINUED | OUTPATIENT
Start: 2024-10-16 | End: 2024-10-16

## 2024-10-15 RX ORDER — ACETAMINOPHEN AND CODEINE PHOSPHATE 300; 30 MG/1; MG/1
300-30 TABLET ORAL
Qty: 10 | Refills: 0 | Status: ACTIVE | COMMUNITY
Start: 2024-10-15 | End: 1900-01-01

## 2024-10-15 NOTE — ASU PATIENT PROFILE, ADULT - NSICDXPASTSURGICALHX_GEN_ALL_CORE_FT
PAST SURGICAL HISTORY:  H/O colonoscopy     History of ankle surgery left ankle biopsy    History of axillary surgery biopsy    S/P endoscopy

## 2024-10-15 NOTE — ASU PATIENT PROFILE, ADULT - BLOOD TRANSFUSION, PREVIOUS, PROFILE
[employer] message sent regarding results, and recommendation as noted below. ----- Message from Charity Cooper MD sent at 4/3/2023  9:58 AM CDT -----  Let patient know of high cholesterol. Needs to follow low fat, low cholesterol,  decrease carbs and increase activity. Meds: low carb diet for now  Repeat ast/alt and lipid panel in 6 months  Recheck TSH and T4 in 6 months too. Ok to refer to Weight loss CLinic, if she's unable to get in with bariatric clinic right away.
no

## 2024-10-15 NOTE — ASU PATIENT PROFILE, ADULT - NS PREOP UNDERSTANDS INFO
Medication(s) Requested:    Disp Refills Start End    amphetamine-dextroamphetamine (ADDERALL) 30 MG tablet 60 tablet 0 12/18/2017     Sig - Route: Take 1 tablet by mouth 2 times daily. - Oral    Class: Eprescribe        Last office visit: 12/23/16, NOS 12/6/17,  and next appt on 1/30.  Last refilled: 12/18/17  Is the patient due for refill of this medication(s): Yes  PDMP review: Criteria met. Prescription request fwd to MD for sig.  
Rx routed/e-scribed to patient's preferred pharmacy; patient is made aware of same.  
Time to be given by MD office. Last meal @0000, no solid foods including dairy products/substitutes, chewing gum or hard candy. Can drink clear liquids up to 3 hours before surgery. Bring photo ID, insurance card and form of payment to the first floor. Must have an escort that is 18+ and they must have a photo ID with them to enter the building. Remove all jewelry, piercings, and contacts before coming to the hospital./yes

## 2024-10-16 ENCOUNTER — TRANSCRIPTION ENCOUNTER (OUTPATIENT)
Age: 44
End: 2024-10-16

## 2024-10-16 ENCOUNTER — OUTPATIENT (OUTPATIENT)
Dept: OUTPATIENT SERVICES | Facility: HOSPITAL | Age: 44
LOS: 1 days | Discharge: HOME | End: 2024-10-16

## 2024-10-16 ENCOUNTER — APPOINTMENT (OUTPATIENT)
Dept: ORTHOPEDIC SURGERY | Facility: AMBULATORY SURGERY CENTER | Age: 44
End: 2024-10-16

## 2024-10-16 VITALS
HEART RATE: 70 BPM | TEMPERATURE: 98 F | DIASTOLIC BLOOD PRESSURE: 69 MMHG | SYSTOLIC BLOOD PRESSURE: 135 MMHG | WEIGHT: 146.17 LBS | HEIGHT: 62 IN | RESPIRATION RATE: 16 BRPM

## 2024-10-16 VITALS
OXYGEN SATURATION: 96 % | HEART RATE: 74 BPM | DIASTOLIC BLOOD PRESSURE: 77 MMHG | SYSTOLIC BLOOD PRESSURE: 124 MMHG | RESPIRATION RATE: 16 BRPM | TEMPERATURE: 97 F

## 2024-10-16 DIAGNOSIS — Z98.890 OTHER SPECIFIED POSTPROCEDURAL STATES: Chronic | ICD-10-CM

## 2024-10-16 PROCEDURE — 29848 WRIST ENDOSCOPY/SURGERY: CPT | Mod: RT

## 2024-10-16 RX ORDER — ONDANSETRON HCL/PF 4 MG/2 ML
4 VIAL (ML) INJECTION ONCE
Refills: 0 | Status: COMPLETED | OUTPATIENT
Start: 2024-10-16 | End: 2024-10-16

## 2024-10-16 RX ORDER — SODIUM CHLORIDE IRRIG SOLUTION 0.9 %
1000 SOLUTION, IRRIGATION IRRIGATION
Refills: 0 | Status: DISCONTINUED | OUTPATIENT
Start: 2024-10-16 | End: 2024-10-16

## 2024-10-16 RX ORDER — ACETAMINOPHEN 325 MG
650 TABLET ORAL ONCE
Refills: 0 | Status: DISCONTINUED | OUTPATIENT
Start: 2024-10-16 | End: 2024-10-16

## 2024-10-16 RX ORDER — OXYCODONE HYDROCHLORIDE 30 MG/1
5 TABLET, FILM COATED, EXTENDED RELEASE ORAL ONCE
Refills: 0 | Status: DISCONTINUED | OUTPATIENT
Start: 2024-10-16 | End: 2024-10-16

## 2024-10-16 RX ADMIN — Medication 4 MILLIGRAM(S): at 11:08

## 2024-10-16 NOTE — ASU DISCHARGE PLAN (ADULT/PEDIATRIC) - FINANCIAL ASSISTANCE
----- Message from Petar Hanna MD sent at 2/18/2018  6:58 PM CST -----  Please add GGT and fractionate alkaline phosphatase    Per Raul in lab - there is not enough blood left from the draw to add either test.  Will discuss with Dr Hanna to see if she would like the patient to have an additional blood draw as she is not scheduled to RTC until August.    Discussed with Dr Hanna.  Will hold off on checking these labs at this time and see what her labs look like at her f/u.  
[facility] provides services at a reduced cost to those who are determined to be eligible through [facility]’s financial assistance program. Information regarding [facility]’s financial assistance program can be found by going to [email or web address] or by calling [phone].

## 2024-10-16 NOTE — ASU DISCHARGE PLAN (ADULT/PEDIATRIC) - CARE PROVIDER_API CALL
Marco A Rothman  [facility]  [address]  [place] [address]  Phone: [phone]  Fax: [phone]  Established Patient  Follow Up Time:

## 2024-10-16 NOTE — ASU DISCHARGE PLAN (ADULT/PEDIATRIC) - ASU DC SPECIAL INSTRUCTIONSFT
Co-Directors: Bunny Cote MD; Marco A Rothman MD; Michael Diaz MD   [facility] [place] [facility] [address] 	  [place] [address] 	 	  Phone [phone] (HAND), Fax [phone]    [email or web address]    Hand Surgery Post Operative Instructions      DRESSING CARE:  1.	Please keep bandage ON and DRY until you return to the office for your 1st postoperative visit.   2.	In the shower you must cover bandage with a plastic bag. You can use tape or a rubber band so no water leaks into the bag.   3.	Please do not exercise as that leads to excessive sweating as the bandage will therefore become moist/ wet.    4.	Do not remove or change your bandage. You may apply more tape if dressing starts to unravel.   5.	Please do not insert any objects, such as a pencil, down into the bandage.     ELEVATION:  1.	Keep hand/wrist above heart level at all times or until bandage feels loose. This will help with swelling of the fingers and can be accomplished by using the FOAM PILLOW.   2.	 A sling will not hold your hand/wrist above your heart and therefore its use should be limited (it may also cause shoulder stiffness).     ACTIVITY:  1.	Moving your fingers daily after surgery is very important to prevent stiffness. Please open your fingers completely and close your fingers completely to achieve full range of motion.  **UNLESS TENDON REPAIR OR NERVE REPAIR SURGERY PERFORMED    2.	Move all joints of the extremity that are not immobilized to prevent stiffness (i.e. shoulder, elbow, fingers, and thumb unless instructed otherwise).   3.	Avoid activities which may re-injure your hand or finger.     DIET:   Regular diet. Start light and progress as tolerated.     PAIN MEDICINE:   1.	Pain medicine was sent to your pharmacy.  2.	Take pain medicine on an “AS NEEDED” basis according to your doctor’s instructions.   3.	Your pain will decrease over the next few days allowing you to:   •	Decrease your pain medicine quantity until you stop.   •	Increase the time between doses until you stop.   4.	You should not drink alcoholic beverages while on pain medication.   5.	Take pain medicine with food to prevent nausea.   6.	Constipation can occur. If no bowel movement occurs within 48 hours take a laxative of your choice (over the counter).	 	      CONTACT PHYSICIAN FOR:   Slight pain, swelling and bluish discoloration are to be expected. If you have breathing difficulty or chest pain dial 911 immediately. However, if the following symptoms occur notify your physician:   •	Temperature above 101° F 	        • Inability to urinate in 8 hours   •	Uncontrolled nausea/vomiting   	• Progressively increasing pain   •	Signs of wound infection 	                • Excessive bleeding  (Redness, swelling, pus-like drainage)     • Increasing numbness   •	Excessive swelling and tightness 	• Splint or cast that is too tight      OFFICE APPOINTMENT:    A staff member from the office will call you in the next 1-2 days to schedule your 1st Post Operative appointment to see your physician back in the office. *IF someone does not reach out to you in the next 1-2 days please call the office.      Patient Name _________________________________ Signature ______________________________ Date__________    Witness _____________________________________________________ Date ______________

## 2024-10-25 ENCOUNTER — APPOINTMENT (OUTPATIENT)
Dept: ORTHOPEDIC SURGERY | Facility: CLINIC | Age: 44
End: 2024-10-25
Payer: MEDICARE

## 2024-10-25 DIAGNOSIS — G56.03 CARPAL TUNNEL SYNDROM,BILATERAL UPPER LIMBS: ICD-10-CM

## 2024-10-25 PROCEDURE — 99024 POSTOP FOLLOW-UP VISIT: CPT

## 2024-12-02 ENCOUNTER — APPOINTMENT (OUTPATIENT)
Dept: ORTHOPEDIC SURGERY | Facility: CLINIC | Age: 44
End: 2024-12-02
Payer: MEDICARE

## 2024-12-02 DIAGNOSIS — G56.03 CARPAL TUNNEL SYNDROM,BILATERAL UPPER LIMBS: ICD-10-CM

## 2024-12-02 PROCEDURE — 99024 POSTOP FOLLOW-UP VISIT: CPT

## (undated) DEVICE — SUT MONOCRYL 5-0 18" P-3 UNDYED

## (undated) DEVICE — GLV 8 PROTEXIS (WHITE)

## (undated) DEVICE — MARKING PEN W RULER

## (undated) DEVICE — PREP BETADINE KIT

## (undated) DEVICE — DRSG STERISTRIPS 0.5 X 4"

## (undated) DEVICE — GLV 8.5 PROTEXIS (WHITE)

## (undated) DEVICE — SYS ENDO STRATOS RELEASE 30 DEG 4MM

## (undated) DEVICE — WARMING BLANKET LOWER ADULT

## (undated) DEVICE — POSITIONER OA ARM ELEVATOR DISP

## (undated) DEVICE — VENODYNE/SCD SLEEVE CALF MEDIUM

## (undated) DEVICE — SOL ANTI FOG

## (undated) DEVICE — SUT ETHILON 5-0 18" P-3

## (undated) DEVICE — TOURNIQUET CUFF 24" DUAL PORT SINGLE BLADDER W PLC (BLACK)

## (undated) DEVICE — PACK HAND

## (undated) DEVICE — TOURNIQUET CUFF 18" DUAL PORT SINGLE BLADDER W PLC  (BLACK)